# Patient Record
Sex: FEMALE | Race: WHITE | NOT HISPANIC OR LATINO | Employment: FULL TIME | URBAN - METROPOLITAN AREA
[De-identification: names, ages, dates, MRNs, and addresses within clinical notes are randomized per-mention and may not be internally consistent; named-entity substitution may affect disease eponyms.]

---

## 2021-01-25 PROBLEM — O11.9 CHRONIC HYPERTENSION WITH SUPERIMPOSED PRE-ECLAMPSIA: Status: ACTIVE | Noted: 2021-01-25

## 2021-01-27 PROBLEM — O10.913 CHRONIC HYPERTENSION WITH EXACERBATION DURING PREGNANCY IN THIRD TRIMESTER: Status: ACTIVE | Noted: 2021-01-27

## 2021-03-01 PROBLEM — E66.01 MORBID OBESITY (HCC): Status: ACTIVE | Noted: 2021-03-01

## 2024-08-01 ENCOUNTER — HOSPITAL ENCOUNTER (OUTPATIENT)
Facility: HOSPITAL | Age: 29
Setting detail: OBSERVATION
Discharge: HOME | End: 2024-08-03
Attending: STUDENT IN AN ORGANIZED HEALTH CARE EDUCATION/TRAINING PROGRAM | Admitting: INTERNAL MEDICINE
Payer: COMMERCIAL

## 2024-08-01 ENCOUNTER — APPOINTMENT (OUTPATIENT)
Dept: RADIOLOGY | Facility: HOSPITAL | Age: 29
End: 2024-08-01
Payer: COMMERCIAL

## 2024-08-01 ENCOUNTER — APPOINTMENT (OUTPATIENT)
Dept: CARDIOLOGY | Facility: HOSPITAL | Age: 29
End: 2024-08-01
Payer: COMMERCIAL

## 2024-08-01 ENCOUNTER — HOSPITAL ENCOUNTER (EMERGENCY)
Facility: HOSPITAL | Age: 29
Discharge: HOME | End: 2024-08-01
Attending: EMERGENCY MEDICINE
Payer: COMMERCIAL

## 2024-08-01 VITALS
HEART RATE: 79 BPM | SYSTOLIC BLOOD PRESSURE: 196 MMHG | HEIGHT: 66 IN | RESPIRATION RATE: 16 BRPM | BODY MASS INDEX: 47.09 KG/M2 | OXYGEN SATURATION: 100 % | TEMPERATURE: 97.5 F | DIASTOLIC BLOOD PRESSURE: 117 MMHG | WEIGHT: 293 LBS

## 2024-08-01 DIAGNOSIS — I16.0 HYPERTENSIVE URGENCY: Primary | ICD-10-CM

## 2024-08-01 DIAGNOSIS — I10 HYPERTENSION, UNSPECIFIED TYPE: Primary | ICD-10-CM

## 2024-08-01 DIAGNOSIS — I10 PRIMARY HYPERTENSION: ICD-10-CM

## 2024-08-01 PROBLEM — E87.6 HYPOKALEMIA: Status: ACTIVE | Noted: 2024-08-01

## 2024-08-01 PROBLEM — R51.9 ACUTE NONINTRACTABLE HEADACHE: Status: ACTIVE | Noted: 2024-08-01

## 2024-08-01 PROBLEM — E66.09 OBESITY DUE TO EXCESS CALORIES: Status: ACTIVE | Noted: 2024-08-01

## 2024-08-01 LAB
ALBUMIN SERPL BCP-MCNC: 4.1 G/DL (ref 3.4–5)
ALBUMIN SERPL BCP-MCNC: 4.2 G/DL (ref 3.4–5)
ALP SERPL-CCNC: 78 U/L (ref 33–110)
ALP SERPL-CCNC: 79 U/L (ref 33–110)
ALT SERPL W P-5'-P-CCNC: 22 U/L (ref 7–45)
ALT SERPL W P-5'-P-CCNC: 24 U/L (ref 7–45)
ANION GAP SERPL CALC-SCNC: 10 MMOL/L (ref 10–20)
ANION GAP SERPL CALC-SCNC: 11 MMOL/L (ref 10–20)
AST SERPL W P-5'-P-CCNC: 16 U/L (ref 9–39)
AST SERPL W P-5'-P-CCNC: 18 U/L (ref 9–39)
ATRIAL RATE: 76 BPM
BASOPHILS # BLD AUTO: 0.02 X10*3/UL (ref 0–0.1)
BASOPHILS # BLD AUTO: 0.02 X10*3/UL (ref 0–0.1)
BASOPHILS NFR BLD AUTO: 0.2 %
BASOPHILS NFR BLD AUTO: 0.2 %
BILIRUB SERPL-MCNC: 0.4 MG/DL (ref 0–1.2)
BILIRUB SERPL-MCNC: 0.5 MG/DL (ref 0–1.2)
BNP SERPL-MCNC: 140 PG/ML (ref 0–99)
BUN SERPL-MCNC: 15 MG/DL (ref 6–23)
BUN SERPL-MCNC: 18 MG/DL (ref 6–23)
CALCIUM SERPL-MCNC: 9.1 MG/DL (ref 8.6–10.3)
CALCIUM SERPL-MCNC: 9.3 MG/DL (ref 8.6–10.3)
CARDIAC TROPONIN I PNL SERPL HS: 10 NG/L (ref 0–13)
CHLORIDE SERPL-SCNC: 103 MMOL/L (ref 98–107)
CHLORIDE SERPL-SCNC: 103 MMOL/L (ref 98–107)
CO2 SERPL-SCNC: 26 MMOL/L (ref 21–32)
CO2 SERPL-SCNC: 26 MMOL/L (ref 21–32)
CREAT SERPL-MCNC: 0.67 MG/DL (ref 0.5–1.05)
CREAT SERPL-MCNC: 0.73 MG/DL (ref 0.5–1.05)
EGFRCR SERPLBLD CKD-EPI 2021: >90 ML/MIN/1.73M*2
EGFRCR SERPLBLD CKD-EPI 2021: >90 ML/MIN/1.73M*2
EOSINOPHIL # BLD AUTO: 0.17 X10*3/UL (ref 0–0.7)
EOSINOPHIL # BLD AUTO: 0.24 X10*3/UL (ref 0–0.7)
EOSINOPHIL NFR BLD AUTO: 1.6 %
EOSINOPHIL NFR BLD AUTO: 2.5 %
ERYTHROCYTE [DISTWIDTH] IN BLOOD BY AUTOMATED COUNT: 15 % (ref 11.5–14.5)
ERYTHROCYTE [DISTWIDTH] IN BLOOD BY AUTOMATED COUNT: 15.2 % (ref 11.5–14.5)
GLUCOSE SERPL-MCNC: 131 MG/DL (ref 74–99)
GLUCOSE SERPL-MCNC: 82 MG/DL (ref 74–99)
HCG UR QL IA.RAPID: NEGATIVE
HCT VFR BLD AUTO: 40.6 % (ref 36–46)
HCT VFR BLD AUTO: 41.5 % (ref 36–46)
HGB BLD-MCNC: 13.8 G/DL (ref 12–16)
HGB BLD-MCNC: 13.9 G/DL (ref 12–16)
IMM GRANULOCYTES # BLD AUTO: 0.03 X10*3/UL (ref 0–0.7)
IMM GRANULOCYTES # BLD AUTO: 0.03 X10*3/UL (ref 0–0.7)
IMM GRANULOCYTES NFR BLD AUTO: 0.3 % (ref 0–0.9)
IMM GRANULOCYTES NFR BLD AUTO: 0.3 % (ref 0–0.9)
LIPASE SERPL-CCNC: 12 U/L (ref 9–82)
LYMPHOCYTES # BLD AUTO: 2.02 X10*3/UL (ref 1.2–4.8)
LYMPHOCYTES # BLD AUTO: 2.57 X10*3/UL (ref 1.2–4.8)
LYMPHOCYTES NFR BLD AUTO: 20.7 %
LYMPHOCYTES NFR BLD AUTO: 24 %
MAGNESIUM SERPL-MCNC: 1.98 MG/DL (ref 1.6–2.4)
MCH RBC QN AUTO: 26.1 PG (ref 26–34)
MCH RBC QN AUTO: 26.4 PG (ref 26–34)
MCHC RBC AUTO-ENTMCNC: 33.5 G/DL (ref 32–36)
MCHC RBC AUTO-ENTMCNC: 34 G/DL (ref 32–36)
MCV RBC AUTO: 78 FL (ref 80–100)
MCV RBC AUTO: 78 FL (ref 80–100)
MONOCYTES # BLD AUTO: 0.46 X10*3/UL (ref 0.1–1)
MONOCYTES # BLD AUTO: 0.48 X10*3/UL (ref 0.1–1)
MONOCYTES NFR BLD AUTO: 4.3 %
MONOCYTES NFR BLD AUTO: 4.9 %
NEUTROPHILS # BLD AUTO: 6.95 X10*3/UL (ref 1.2–7.7)
NEUTROPHILS # BLD AUTO: 7.45 X10*3/UL (ref 1.2–7.7)
NEUTROPHILS NFR BLD AUTO: 69.6 %
NEUTROPHILS NFR BLD AUTO: 71.4 %
NRBC BLD-RTO: 0 /100 WBCS (ref 0–0)
NRBC BLD-RTO: 0 /100 WBCS (ref 0–0)
P AXIS: 43 DEGREES
PLATELET # BLD AUTO: 324 X10*3/UL (ref 150–450)
PLATELET # BLD AUTO: 344 X10*3/UL (ref 150–450)
POTASSIUM SERPL-SCNC: 3.3 MMOL/L (ref 3.5–5.3)
POTASSIUM SERPL-SCNC: 3.4 MMOL/L (ref 3.5–5.3)
PR INTERVAL: 168 MS
PROT SERPL-MCNC: 7.4 G/DL (ref 6.4–8.2)
PROT SERPL-MCNC: 7.7 G/DL (ref 6.4–8.2)
Q ONSET: 249 MS
QRS COUNT: 12 BEATS
QRS DURATION: 116 MS
QT INTERVAL: 432 MS
QTC CALCULATION(BAZETT): 476 MS
QTC FREDERICIA: 461 MS
R AXIS: 32 DEGREES
RBC # BLD AUTO: 5.22 X10*6/UL (ref 4–5.2)
RBC # BLD AUTO: 5.33 X10*6/UL (ref 4–5.2)
SODIUM SERPL-SCNC: 136 MMOL/L (ref 136–145)
SODIUM SERPL-SCNC: 137 MMOL/L (ref 136–145)
T AXIS: 23 DEGREES
T OFFSET: 465 MS
VENTRICULAR RATE: 73 BPM
WBC # BLD AUTO: 10.7 X10*3/UL (ref 4.4–11.3)
WBC # BLD AUTO: 9.7 X10*3/UL (ref 4.4–11.3)

## 2024-08-01 PROCEDURE — 80053 COMPREHEN METABOLIC PANEL: CPT | Performed by: STUDENT IN AN ORGANIZED HEALTH CARE EDUCATION/TRAINING PROGRAM

## 2024-08-01 PROCEDURE — 85025 COMPLETE CBC W/AUTO DIFF WBC: CPT | Performed by: STUDENT IN AN ORGANIZED HEALTH CARE EDUCATION/TRAINING PROGRAM

## 2024-08-01 PROCEDURE — 93005 ELECTROCARDIOGRAM TRACING: CPT

## 2024-08-01 PROCEDURE — 2500000002 HC RX 250 W HCPCS SELF ADMINISTERED DRUGS (ALT 637 FOR MEDICARE OP, ALT 636 FOR OP/ED): Performed by: NURSE PRACTITIONER

## 2024-08-01 PROCEDURE — 83690 ASSAY OF LIPASE: CPT | Performed by: STUDENT IN AN ORGANIZED HEALTH CARE EDUCATION/TRAINING PROGRAM

## 2024-08-01 PROCEDURE — 84484 ASSAY OF TROPONIN QUANT: CPT | Performed by: EMERGENCY MEDICINE

## 2024-08-01 PROCEDURE — 2500000004 HC RX 250 GENERAL PHARMACY W/ HCPCS (ALT 636 FOR OP/ED)

## 2024-08-01 PROCEDURE — 2500000004 HC RX 250 GENERAL PHARMACY W/ HCPCS (ALT 636 FOR OP/ED): Performed by: NURSE PRACTITIONER

## 2024-08-01 PROCEDURE — 2500000004 HC RX 250 GENERAL PHARMACY W/ HCPCS (ALT 636 FOR OP/ED): Performed by: EMERGENCY MEDICINE

## 2024-08-01 PROCEDURE — G0378 HOSPITAL OBSERVATION PER HR: HCPCS

## 2024-08-01 PROCEDURE — 36415 COLL VENOUS BLD VENIPUNCTURE: CPT | Performed by: EMERGENCY MEDICINE

## 2024-08-01 PROCEDURE — 96365 THER/PROPH/DIAG IV INF INIT: CPT | Mod: 59

## 2024-08-01 PROCEDURE — 99285 EMERGENCY DEPT VISIT HI MDM: CPT

## 2024-08-01 PROCEDURE — 99223 1ST HOSP IP/OBS HIGH 75: CPT | Performed by: NURSE PRACTITIONER

## 2024-08-01 PROCEDURE — 85025 COMPLETE CBC W/AUTO DIFF WBC: CPT | Performed by: EMERGENCY MEDICINE

## 2024-08-01 PROCEDURE — 70450 CT HEAD/BRAIN W/O DYE: CPT

## 2024-08-01 PROCEDURE — 83735 ASSAY OF MAGNESIUM: CPT | Performed by: STUDENT IN AN ORGANIZED HEALTH CARE EDUCATION/TRAINING PROGRAM

## 2024-08-01 PROCEDURE — 84075 ASSAY ALKALINE PHOSPHATASE: CPT | Performed by: EMERGENCY MEDICINE

## 2024-08-01 PROCEDURE — 2500000001 HC RX 250 WO HCPCS SELF ADMINISTERED DRUGS (ALT 637 FOR MEDICARE OP): Performed by: EMERGENCY MEDICINE

## 2024-08-01 PROCEDURE — 71045 X-RAY EXAM CHEST 1 VIEW: CPT

## 2024-08-01 PROCEDURE — 96366 THER/PROPH/DIAG IV INF ADDON: CPT

## 2024-08-01 PROCEDURE — 84484 ASSAY OF TROPONIN QUANT: CPT | Performed by: STUDENT IN AN ORGANIZED HEALTH CARE EDUCATION/TRAINING PROGRAM

## 2024-08-01 PROCEDURE — 96374 THER/PROPH/DIAG INJ IV PUSH: CPT

## 2024-08-01 PROCEDURE — 81025 URINE PREGNANCY TEST: CPT | Performed by: NURSE PRACTITIONER

## 2024-08-01 PROCEDURE — 71045 X-RAY EXAM CHEST 1 VIEW: CPT | Performed by: RADIOLOGY

## 2024-08-01 PROCEDURE — 83880 ASSAY OF NATRIURETIC PEPTIDE: CPT | Performed by: STUDENT IN AN ORGANIZED HEALTH CARE EDUCATION/TRAINING PROGRAM

## 2024-08-01 PROCEDURE — 70450 CT HEAD/BRAIN W/O DYE: CPT | Performed by: STUDENT IN AN ORGANIZED HEALTH CARE EDUCATION/TRAINING PROGRAM

## 2024-08-01 PROCEDURE — 99285 EMERGENCY DEPT VISIT HI MDM: CPT | Mod: 25

## 2024-08-01 PROCEDURE — 96372 THER/PROPH/DIAG INJ SC/IM: CPT | Performed by: NURSE PRACTITIONER

## 2024-08-01 RX ORDER — LABETALOL HYDROCHLORIDE 5 MG/ML
INJECTION, SOLUTION INTRAVENOUS
Status: COMPLETED
Start: 2024-08-01 | End: 2024-08-01

## 2024-08-01 RX ORDER — ONDANSETRON HYDROCHLORIDE 2 MG/ML
4 INJECTION, SOLUTION INTRAVENOUS EVERY 8 HOURS PRN
Status: DISCONTINUED | OUTPATIENT
Start: 2024-08-01 | End: 2024-08-03 | Stop reason: HOSPADM

## 2024-08-01 RX ORDER — HYDRALAZINE HYDROCHLORIDE 20 MG/ML
10 INJECTION INTRAMUSCULAR; INTRAVENOUS EVERY 6 HOURS PRN
Status: DISCONTINUED | OUTPATIENT
Start: 2024-08-01 | End: 2024-08-03 | Stop reason: HOSPADM

## 2024-08-01 RX ORDER — ACETAMINOPHEN 160 MG/5ML
650 SUSPENSION ORAL EVERY 4 HOURS PRN
Status: DISCONTINUED | OUTPATIENT
Start: 2024-08-01 | End: 2024-08-03 | Stop reason: HOSPADM

## 2024-08-01 RX ORDER — TALC
6 POWDER (GRAM) TOPICAL NIGHTLY PRN
Status: DISCONTINUED | OUTPATIENT
Start: 2024-08-01 | End: 2024-08-03 | Stop reason: HOSPADM

## 2024-08-01 RX ORDER — LABETALOL 100 MG/1
100 TABLET, FILM COATED ORAL 2 TIMES DAILY
Status: DISCONTINUED | OUTPATIENT
Start: 2024-08-01 | End: 2024-08-01

## 2024-08-01 RX ORDER — ACETAMINOPHEN 325 MG/1
650 TABLET ORAL EVERY 4 HOURS PRN
Status: DISCONTINUED | OUTPATIENT
Start: 2024-08-01 | End: 2024-08-03 | Stop reason: HOSPADM

## 2024-08-01 RX ORDER — ACETAMINOPHEN 325 MG/1
975 TABLET ORAL ONCE
Status: COMPLETED | OUTPATIENT
Start: 2024-08-01 | End: 2024-08-01

## 2024-08-01 RX ORDER — LABETALOL 100 MG/1
100 TABLET, FILM COATED ORAL 2 TIMES DAILY
Qty: 60 TABLET | Refills: 0 | Status: SHIPPED | OUTPATIENT
Start: 2024-08-01 | End: 2024-08-03 | Stop reason: HOSPADM

## 2024-08-01 RX ORDER — LABETALOL 100 MG/1
100 TABLET, FILM COATED ORAL EVERY 8 HOURS
Status: DISCONTINUED | OUTPATIENT
Start: 2024-08-02 | End: 2024-08-02

## 2024-08-01 RX ORDER — LABETALOL HYDROCHLORIDE 5 MG/ML
20 INJECTION, SOLUTION INTRAVENOUS ONCE
Status: COMPLETED | OUTPATIENT
Start: 2024-08-01 | End: 2024-08-01

## 2024-08-01 RX ORDER — ACETAMINOPHEN 650 MG/1
650 SUPPOSITORY RECTAL EVERY 4 HOURS PRN
Status: DISCONTINUED | OUTPATIENT
Start: 2024-08-01 | End: 2024-08-03 | Stop reason: HOSPADM

## 2024-08-01 RX ORDER — ENOXAPARIN SODIUM 100 MG/ML
60 INJECTION SUBCUTANEOUS EVERY 12 HOURS SCHEDULED
Status: DISCONTINUED | OUTPATIENT
Start: 2024-08-01 | End: 2024-08-03 | Stop reason: HOSPADM

## 2024-08-01 RX ORDER — NITROGLYCERIN 0.4 MG/1
0.4 TABLET SUBLINGUAL EVERY 5 MIN PRN
Status: DISCONTINUED | OUTPATIENT
Start: 2024-08-01 | End: 2024-08-01 | Stop reason: HOSPADM

## 2024-08-01 RX ORDER — NIFEDIPINE 30 MG/1
30 TABLET, FILM COATED, EXTENDED RELEASE ORAL 2 TIMES DAILY
Status: DISCONTINUED | OUTPATIENT
Start: 2024-08-01 | End: 2024-08-02

## 2024-08-01 RX ORDER — POTASSIUM CHLORIDE 14.9 MG/ML
20 INJECTION INTRAVENOUS ONCE
Status: COMPLETED | OUTPATIENT
Start: 2024-08-01 | End: 2024-08-02

## 2024-08-01 RX ORDER — POLYETHYLENE GLYCOL 3350 17 G/17G
17 POWDER, FOR SOLUTION ORAL DAILY
Status: DISCONTINUED | OUTPATIENT
Start: 2024-08-02 | End: 2024-08-03 | Stop reason: HOSPADM

## 2024-08-01 RX ORDER — ONDANSETRON 4 MG/1
4 TABLET, ORALLY DISINTEGRATING ORAL EVERY 8 HOURS PRN
Status: DISCONTINUED | OUTPATIENT
Start: 2024-08-01 | End: 2024-08-03 | Stop reason: HOSPADM

## 2024-08-01 SDOH — SOCIAL STABILITY: SOCIAL INSECURITY: ARE THERE ANY APPARENT SIGNS OF INJURIES/BEHAVIORS THAT COULD BE RELATED TO ABUSE/NEGLECT?: NO

## 2024-08-01 SDOH — SOCIAL STABILITY: SOCIAL INSECURITY: DO YOU FEEL ANYONE HAS EXPLOITED OR TAKEN ADVANTAGE OF YOU FINANCIALLY OR OF YOUR PERSONAL PROPERTY?: NO

## 2024-08-01 SDOH — SOCIAL STABILITY: SOCIAL INSECURITY: WERE YOU ABLE TO COMPLETE ALL THE BEHAVIORAL HEALTH SCREENINGS?: YES

## 2024-08-01 SDOH — SOCIAL STABILITY: SOCIAL INSECURITY: HAVE YOU HAD THOUGHTS OF HARMING ANYONE ELSE?: NO

## 2024-08-01 SDOH — SOCIAL STABILITY: SOCIAL INSECURITY: ABUSE: ADULT

## 2024-08-01 SDOH — SOCIAL STABILITY: SOCIAL INSECURITY: HAVE YOU HAD ANY THOUGHTS OF HARMING ANYONE ELSE?: NO

## 2024-08-01 SDOH — SOCIAL STABILITY: SOCIAL INSECURITY: HAS ANYONE EVER THREATENED TO HURT YOUR FAMILY OR YOUR PETS?: NO

## 2024-08-01 SDOH — SOCIAL STABILITY: SOCIAL INSECURITY: ARE YOU OR HAVE YOU BEEN THREATENED OR ABUSED PHYSICALLY, EMOTIONALLY, OR SEXUALLY BY ANYONE?: NO

## 2024-08-01 SDOH — SOCIAL STABILITY: SOCIAL INSECURITY: DOES ANYONE TRY TO KEEP YOU FROM HAVING/CONTACTING OTHER FRIENDS OR DOING THINGS OUTSIDE YOUR HOME?: NO

## 2024-08-01 SDOH — SOCIAL STABILITY: SOCIAL INSECURITY: DO YOU FEEL UNSAFE GOING BACK TO THE PLACE WHERE YOU ARE LIVING?: NO

## 2024-08-01 ASSESSMENT — LIFESTYLE VARIABLES
EVER FELT BAD OR GUILTY ABOUT YOUR DRINKING: NO
AUDIT-C TOTAL SCORE: 0
AUDIT-C TOTAL SCORE: 0
HOW OFTEN DO YOU HAVE A DRINK CONTAINING ALCOHOL: NEVER
SUBSTANCE_ABUSE_PAST_12_MONTHS: NO
TOTAL SCORE: 0
EVER FELT BAD OR GUILTY ABOUT YOUR DRINKING: NO
AUDIT-C TOTAL SCORE: 1
PRESCIPTION_ABUSE_PAST_12_MONTHS: NO
AUDIT-C TOTAL SCORE: 1
HAVE YOU EVER FELT YOU SHOULD CUT DOWN ON YOUR DRINKING: NO
HOW MANY STANDARD DRINKS CONTAINING ALCOHOL DO YOU HAVE ON A TYPICAL DAY: PATIENT DOES NOT DRINK
HOW OFTEN DO YOU HAVE A DRINK CONTAINING ALCOHOL: MONTHLY OR LESS
HOW MANY STANDARD DRINKS CONTAINING ALCOHOL DO YOU HAVE ON A TYPICAL DAY: 1 OR 2
TOTAL SCORE: 0
HOW OFTEN DO YOU HAVE 6 OR MORE DRINKS ON ONE OCCASION: NEVER
HOW OFTEN DO YOU HAVE 6 OR MORE DRINKS ON ONE OCCASION: NEVER
EVER HAD A DRINK FIRST THING IN THE MORNING TO STEADY YOUR NERVES TO GET RID OF A HANGOVER: NO
SKIP TO QUESTIONS 9-10: 1
HAVE PEOPLE ANNOYED YOU BY CRITICIZING YOUR DRINKING: NO
SKIP TO QUESTIONS 9-10: 1
EVER HAD A DRINK FIRST THING IN THE MORNING TO STEADY YOUR NERVES TO GET RID OF A HANGOVER: NO
HAVE YOU EVER FELT YOU SHOULD CUT DOWN ON YOUR DRINKING: NO
HAVE PEOPLE ANNOYED YOU BY CRITICIZING YOUR DRINKING: NO

## 2024-08-01 ASSESSMENT — COLUMBIA-SUICIDE SEVERITY RATING SCALE - C-SSRS

## 2024-08-01 ASSESSMENT — PAIN SCALES - GENERAL
PAINLEVEL_OUTOF10: 6
PAINLEVEL_OUTOF10: 5 - MODERATE PAIN
PAINLEVEL_OUTOF10: 0 - NO PAIN

## 2024-08-01 ASSESSMENT — ENCOUNTER SYMPTOMS
SLEEP DISTURBANCE: 0
VOMITING: 0
WHEEZING: 0
ABDOMINAL PAIN: 0
FATIGUE: 1
DIFFICULTY URINATING: 0
FLANK PAIN: 0
NUMBNESS: 0
ABDOMINAL DISTENTION: 0
CHEST TIGHTNESS: 0
EYE DISCHARGE: 0
WOUND: 0
HALLUCINATIONS: 0
DYSURIA: 0
DIARRHEA: 0
SEIZURES: 0
COUGH: 0
NECK PAIN: 0
TROUBLE SWALLOWING: 0
EYE PAIN: 0
FREQUENCY: 0
ADENOPATHY: 0
DIZZINESS: 0
PHOTOPHOBIA: 0
TREMORS: 0
ARTHRALGIAS: 0
POLYDIPSIA: 0
CHILLS: 0
COLOR CHANGE: 0
NECK STIFFNESS: 0
CHOKING: 0
SORE THROAT: 0
APPETITE CHANGE: 0
VOICE CHANGE: 0
EYE ITCHING: 0
HEADACHES: 1
DYSPHORIC MOOD: 0
SHORTNESS OF BREATH: 0
BACK PAIN: 0
UNEXPECTED WEIGHT CHANGE: 0
BRUISES/BLEEDS EASILY: 0
SINUS PAIN: 0
POLYPHAGIA: 0
PALPITATIONS: 0
NERVOUS/ANXIOUS: 0
APNEA: 0
HEMATURIA: 0
CONFUSION: 0
MYALGIAS: 0
SPEECH DIFFICULTY: 0
NAUSEA: 1
LIGHT-HEADEDNESS: 0
BLOOD IN STOOL: 0
FEVER: 0
CONSTIPATION: 0
WEAKNESS: 0

## 2024-08-01 ASSESSMENT — ACTIVITIES OF DAILY LIVING (ADL)
PATIENT'S MEMORY ADEQUATE TO SAFELY COMPLETE DAILY ACTIVITIES?: YES
FEEDING YOURSELF: INDEPENDENT
TOILETING: INDEPENDENT
GROOMING: INDEPENDENT
WALKS IN HOME: INDEPENDENT
JUDGMENT_ADEQUATE_SAFELY_COMPLETE_DAILY_ACTIVITIES: YES
ADEQUATE_TO_COMPLETE_ADL: YES
HEARING - LEFT EAR: FUNCTIONAL
HEARING - RIGHT EAR: FUNCTIONAL
DRESSING YOURSELF: INDEPENDENT
BATHING: INDEPENDENT
LACK_OF_TRANSPORTATION: NO

## 2024-08-01 ASSESSMENT — COGNITIVE AND FUNCTIONAL STATUS - GENERAL
MOBILITY SCORE: 24
DAILY ACTIVITIY SCORE: 24
PATIENT BASELINE BEDBOUND: NO

## 2024-08-01 ASSESSMENT — PAIN DESCRIPTION - LOCATION
LOCATION: CHEST
LOCATION: HEAD

## 2024-08-01 ASSESSMENT — PATIENT HEALTH QUESTIONNAIRE - PHQ9
2. FEELING DOWN, DEPRESSED OR HOPELESS: NOT AT ALL
SUM OF ALL RESPONSES TO PHQ9 QUESTIONS 1 & 2: 0
SUM OF ALL RESPONSES TO PHQ9 QUESTIONS 1 & 2: 0
1. LITTLE INTEREST OR PLEASURE IN DOING THINGS: NOT AT ALL
1. LITTLE INTEREST OR PLEASURE IN DOING THINGS: NOT AT ALL
2. FEELING DOWN, DEPRESSED OR HOPELESS: NOT AT ALL

## 2024-08-01 ASSESSMENT — PAIN - FUNCTIONAL ASSESSMENT
PAIN_FUNCTIONAL_ASSESSMENT: 0-10
PAIN_FUNCTIONAL_ASSESSMENT: 0-10

## 2024-08-01 ASSESSMENT — PAIN DESCRIPTION - PAIN TYPE: TYPE: ACUTE PAIN

## 2024-08-01 NOTE — ED TRIAGE NOTES
Pt here with c/o blurred vision and right arm numbness. Hx of htn. Not on any meds. Bp high in triage. No cp

## 2024-08-01 NOTE — ED PROVIDER NOTES
HPI   Chief Complaint   Patient presents with   • Hypertension       Patient presents today with blurry vision and lightheadedness.  She states that she was at work this morning and thought she was going to have a stroke.  She states she felt very lightheaded and her vision was blurred in both eyes.  Then about 45 minutes ago she had some chest tightness.  It does not radiate.  She denies any shortness of breath with this.  No nausea or vomiting.  She does have a history of hypertension.  She states that she has been on labetalol in the past as well as nifedipine during her pregnancy but is currently on no medications for blood pressure.  No slurred speech or facial droop.  She denies any weakness in her arms or legs.                          No data recorded                Patient History   No past medical history on file.  No past surgical history on file.  No family history on file.  Social History     Tobacco Use   • Smoking status: Not on file   • Smokeless tobacco: Not on file   Substance Use Topics   • Alcohol use: Not on file   • Drug use: Not on file       Physical Exam   ED Triage Vitals [08/01/24 0827]   Temperature Heart Rate Respirations BP   36.4 °C (97.5 °F) (!) 104 19 --      Pulse Ox Temp Source Heart Rate Source Patient Position   100 % Temporal -- Sitting      BP Location FiO2 (%)     Left arm --       Physical Exam  Vitals and nursing note reviewed.   Constitutional:       Appearance: Normal appearance.   HENT:      Head: Normocephalic and atraumatic.      Mouth/Throat:      Mouth: Mucous membranes are moist.   Eyes:      Extraocular Movements: Extraocular movements intact.      Pupils: Pupils are equal, round, and reactive to light.   Cardiovascular:      Rate and Rhythm: Normal rate and regular rhythm.      Heart sounds: No murmur heard.  Pulmonary:      Effort: Pulmonary effort is normal. No respiratory distress.      Breath sounds: Normal breath sounds.   Abdominal:      General: There is no  distension.      Palpations: Abdomen is soft.      Tenderness: There is no abdominal tenderness.   Musculoskeletal:         General: No tenderness or deformity. Normal range of motion.      Cervical back: Neck supple.      Right lower leg: No edema.      Left lower leg: No edema.   Skin:     General: Skin is warm and dry.      Findings: No lesion or rash.   Neurological:      General: No focal deficit present.      Mental Status: She is alert and oriented to person, place, and time.      Sensory: No sensory deficit.      Motor: No weakness.      Comments: NIH equals 0   Psychiatric:         Behavior: Behavior normal.       Labs Reviewed   TROPONIN SERIES- (INITIAL, 1 HR)    Narrative:     The following orders were created for panel order Troponin I Series, High Sensitivity (0, 1 HR).  Procedure                               Abnormality         Status                     ---------                               -----------         ------                     Troponin I, High Sensiti...[[id number]]                                                   Please view results for these tests on the individual orders.   CBC WITH AUTO DIFFERENTIAL   COMPREHENSIVE METABOLIC PANEL   SERIAL TROPONIN-INITIAL     XR chest 1 view    (Results Pending)     ED Course & MDM   Diagnoses as of 08/01/24 1144   Hypertension, unspecified type       Medical Decision Making  Differentials include potential emergency/urgency, ACS, musculoskeletal pain, electrolyte abnormality.  Imaging studies, if performed, were independently reviewed and interpreted by myself and confirmed by radiologist. EKG(s), if performed, were interpreted by myself.The patient had an EKG that was sinus rhythm with a rate of 92.  No acute ST changes.  QTc 457, .  Repeat EKG was sinus rhythm at 73, again no acute ischemia.  QTc 476, .    Laboratory studies unremarkable except for potassium 3.4.  Troponin is negative.  Chest x-ray unremarkable.  Her initial blood  pressure was elevated at 238/149.  She was given 3 nitroglycerin without any relief.  I then gave her 20 mg of IV labetalol.  Repeat blood pressure is now down to 197/124.  Upon reevaluation at 11:40 AM she is feeling much better.  She wants to be treated as an outpatient does not want to stay in the hospital.  She has been on labetalol previously at home.  I stressed that I am going to restart this and she needs to be compliant with this to help with her blood pressure.  Patient will be discharged to follow-up with her PCP.        Procedure  Procedures     oRbby Hoang MD  08/01/24 1145

## 2024-08-02 ENCOUNTER — APPOINTMENT (OUTPATIENT)
Dept: RADIOLOGY | Facility: HOSPITAL | Age: 29
End: 2024-08-02
Payer: COMMERCIAL

## 2024-08-02 LAB
ANION GAP SERPL CALC-SCNC: 11 MMOL/L (ref 10–20)
ATRIAL RATE: 74 BPM
BUN SERPL-MCNC: 11 MG/DL (ref 6–23)
CALCIUM SERPL-MCNC: 8.5 MG/DL (ref 8.6–10.3)
CHLORIDE SERPL-SCNC: 108 MMOL/L (ref 98–107)
CO2 SERPL-SCNC: 23 MMOL/L (ref 21–32)
CREAT SERPL-MCNC: 0.53 MG/DL (ref 0.5–1.05)
EGFRCR SERPLBLD CKD-EPI 2021: >90 ML/MIN/1.73M*2
GLUCOSE SERPL-MCNC: 84 MG/DL (ref 74–99)
P AXIS: 17 DEGREES
POTASSIUM SERPL-SCNC: 3.8 MMOL/L (ref 3.5–5.3)
PR INTERVAL: 176 MS
Q ONSET: 249 MS
QRS COUNT: 12 BEATS
QRS DURATION: 109 MS
QT INTERVAL: 438 MS
QTC CALCULATION(BAZETT): 480 MS
QTC FREDERICIA: 465 MS
R AXIS: 28 DEGREES
SODIUM SERPL-SCNC: 138 MMOL/L (ref 136–145)
T AXIS: 19 DEGREES
T OFFSET: 468 MS
VENTRICULAR RATE: 72 BPM

## 2024-08-02 PROCEDURE — 36415 COLL VENOUS BLD VENIPUNCTURE: CPT | Performed by: NURSE PRACTITIONER

## 2024-08-02 PROCEDURE — 96374 THER/PROPH/DIAG INJ IV PUSH: CPT | Mod: 59

## 2024-08-02 PROCEDURE — 70450 CT HEAD/BRAIN W/O DYE: CPT | Performed by: RADIOLOGY

## 2024-08-02 PROCEDURE — 99233 SBSQ HOSP IP/OBS HIGH 50: CPT | Performed by: INTERNAL MEDICINE

## 2024-08-02 PROCEDURE — G0378 HOSPITAL OBSERVATION PER HR: HCPCS

## 2024-08-02 PROCEDURE — 2500000001 HC RX 250 WO HCPCS SELF ADMINISTERED DRUGS (ALT 637 FOR MEDICARE OP): Performed by: INTERNAL MEDICINE

## 2024-08-02 PROCEDURE — 2500000004 HC RX 250 GENERAL PHARMACY W/ HCPCS (ALT 636 FOR OP/ED): Performed by: NURSE PRACTITIONER

## 2024-08-02 PROCEDURE — 70450 CT HEAD/BRAIN W/O DYE: CPT

## 2024-08-02 PROCEDURE — 96375 TX/PRO/DX INJ NEW DRUG ADDON: CPT

## 2024-08-02 PROCEDURE — 82374 ASSAY BLOOD CARBON DIOXIDE: CPT | Performed by: NURSE PRACTITIONER

## 2024-08-02 PROCEDURE — 96372 THER/PROPH/DIAG INJ SC/IM: CPT | Performed by: NURSE PRACTITIONER

## 2024-08-02 RX ORDER — LISINOPRIL 20 MG/1
20 TABLET ORAL DAILY
Status: DISCONTINUED | OUTPATIENT
Start: 2024-08-02 | End: 2024-08-03 | Stop reason: HOSPADM

## 2024-08-02 RX ORDER — HYDROCHLOROTHIAZIDE 25 MG/1
25 TABLET ORAL DAILY
Status: DISCONTINUED | OUTPATIENT
Start: 2024-08-02 | End: 2024-08-03 | Stop reason: HOSPADM

## 2024-08-02 RX ORDER — AMLODIPINE BESYLATE 10 MG/1
10 TABLET ORAL DAILY
Status: DISCONTINUED | OUTPATIENT
Start: 2024-08-02 | End: 2024-08-03 | Stop reason: HOSPADM

## 2024-08-02 RX ORDER — BUTALBITAL, ACETAMINOPHEN AND CAFFEINE 50; 325; 40 MG/1; MG/1; MG/1
1 TABLET ORAL EVERY 4 HOURS PRN
Status: DISCONTINUED | OUTPATIENT
Start: 2024-08-02 | End: 2024-08-03 | Stop reason: HOSPADM

## 2024-08-02 RX ORDER — CARVEDILOL 12.5 MG/1
12.5 TABLET ORAL 2 TIMES DAILY
Status: DISCONTINUED | OUTPATIENT
Start: 2024-08-02 | End: 2024-08-03 | Stop reason: HOSPADM

## 2024-08-02 RX ORDER — LABETALOL 100 MG/1
100 TABLET, FILM COATED ORAL ONCE
Status: COMPLETED | OUTPATIENT
Start: 2024-08-02 | End: 2024-08-02

## 2024-08-02 ASSESSMENT — COGNITIVE AND FUNCTIONAL STATUS - GENERAL
DAILY ACTIVITIY SCORE: 24
DAILY ACTIVITIY SCORE: 24
MOBILITY SCORE: 24
MOBILITY SCORE: 24

## 2024-08-02 ASSESSMENT — PAIN - FUNCTIONAL ASSESSMENT
PAIN_FUNCTIONAL_ASSESSMENT: 0-10
PAIN_FUNCTIONAL_ASSESSMENT: 0-10

## 2024-08-02 ASSESSMENT — ACTIVITIES OF DAILY LIVING (ADL): LACK_OF_TRANSPORTATION: NO

## 2024-08-02 ASSESSMENT — VISUAL ACUITY: OU: 1

## 2024-08-02 ASSESSMENT — PAIN SCALES - GENERAL
PAINLEVEL_OUTOF10: 3
PAINLEVEL_OUTOF10: 0 - NO PAIN
PAINLEVEL_OUTOF10: 8
PAINLEVEL_OUTOF10: 3

## 2024-08-02 ASSESSMENT — PAIN DESCRIPTION - LOCATION
LOCATION: HEAD
LOCATION: HEAD

## 2024-08-02 NOTE — PROGRESS NOTES
Bernadette Meeks is a 29 y.o. female on day 0 of admission presenting with Hypertensive urgency.      Subjective   History Of Present Illness:  Bernadette Meeks is a 29 y.o. female with PMHx s/f HTN, obesity  presenting with elevated blood pressure.  The patient has a previous history of hypertension several years back first diagnosed during the time of her pregnancy.  At that time she was on labetalol 200 mg 3 times a day and Procardia 120 mg daily patient subsequently had her child and really did not follow-up on her hypertension.  She had presented to emergency department earlier today was given IV labetalol and offered admission patient declined went home she had prescription for labetalol but said on the prescription because she could not start till tomorrow she did not take any began to have some headache nausea and came back to the hospital.  Patient currently denies any chest pain any shortness of breath palpitations she admits to headache nausea dizziness denies abdominal pain back pain urinary symptoms.     ED Course (Summary):   Vitals on presentation: Initial blood pressure 212/147 post labetalol 189/109 heart rate 91 respiratory rate 16 temperature 98.1 SpO2 99% on room air  Labs: Chemistry panel sodium 136 potassium 3.3 chloride 103 CO2 26 BUN 15 creatinine 0.67 glucose 131 liver enzymes are within normal limits magnesium 1.98 BN peptide 140 CBC shows WBC 10.7 hemoglobin 13.9 hematocrit 41.5 platelets 344 imaging: Repeated troponins are 10  Interventions: Patient given 10 mg of IV labetalol referred for admission  4.   Imaging EKG shows sinus rhythm and LVH CT of head negative for acute intracranial abnormality chest x-ray shows lungs to be clear no acute cardiopulmonary process     ED Course:      ED Course as of 08/01/24 2245   Thu Aug 01, 2024   2205 IMPRESSION:  No acute intracranial abnormality.       [CF]       ED Course User Index  [CF] Shelby Zaldivar MD           Diagnoses as of 08/01/24 2245  "  Hypertensive urgency          08/02/2024: Patient was evaluated this morning. She is not in acute distress. She complains of a headache that is \"all over\" and some vision changes associated with the headache.       Objective     Last Recorded Vitals  BP (!) 181/117   Pulse 73   Temp 36.6 °C (97.8 °F) (Temporal)   Resp 19   Wt 147 kg (323 lb 6.6 oz)   SpO2 97%   Intake/Output last 3 Shifts:    Intake/Output Summary (Last 24 hours) at 8/2/2024 1311  Last data filed at 8/2/2024 0151  Gross per 24 hour   Intake 100 ml   Output --   Net 100 ml       Admission Weight  Weight: 144 kg (318 lb) (08/01/24 1953)    Daily Weight  08/01/24 : 147 kg (323 lb 6.6 oz)    Image Results  ECG 12 lead  Sinus rhythm  Atrial premature complex  Probable left atrial enlargement  Left ventricular hypertrophy  ST elevation, consider anterior injury  Borderline prolonged QT interval      Physical Exam  Vitals reviewed.   Constitutional:       General: She is awake.      Appearance: Normal appearance. She is well-groomed. She is obese.   HENT:      Head: Normocephalic and atraumatic.   Eyes:      General: Vision grossly intact. Gaze aligned appropriately.      Extraocular Movements: Extraocular movements intact.      Conjunctiva/sclera: Conjunctivae normal.   Cardiovascular:      Rate and Rhythm: Normal rate and regular rhythm.      Pulses: Normal pulses.      Heart sounds: Normal heart sounds.   Pulmonary:      Effort: Pulmonary effort is normal.      Breath sounds: Normal breath sounds.   Abdominal:      General: Bowel sounds are normal.      Palpations: Abdomen is soft.   Neurological:      General: No focal deficit present.      Mental Status: She is alert and oriented to person, place, and time.   Psychiatric:         Attention and Perception: Attention and perception normal.         Mood and Affect: Mood and affect normal.         Speech: Speech normal.         Behavior: Behavior normal. Behavior is cooperative.         Thought " Content: Thought content normal.         Cognition and Memory: Cognition and memory normal.         Judgment: Judgment normal.     Scheduled medications  amLODIPine, 10 mg, oral, Daily  carvedilol, 12.5 mg, oral, BID  enoxaparin, 60 mg, subcutaneous, q12h PJ  hydroCHLOROthiazide, 25 mg, oral, Daily  lisinopril, 20 mg, oral, Daily  polyethylene glycol, 17 g, oral, Daily      Continuous medications     PRN medications  PRN medications: acetaminophen **OR** acetaminophen **OR** acetaminophen, hydrALAZINE, melatonin, ondansetron ODT **OR** ondansetron     Results for orders placed or performed during the hospital encounter of 08/01/24 (from the past 24 hour(s))   CBC and Auto Differential   Result Value Ref Range    WBC 10.7 4.4 - 11.3 x10*3/uL    nRBC 0.0 0.0 - 0.0 /100 WBCs    RBC 5.33 (H) 4.00 - 5.20 x10*6/uL    Hemoglobin 13.9 12.0 - 16.0 g/dL    Hematocrit 41.5 36.0 - 46.0 %    MCV 78 (L) 80 - 100 fL    MCH 26.1 26.0 - 34.0 pg    MCHC 33.5 32.0 - 36.0 g/dL    RDW 15.2 (H) 11.5 - 14.5 %    Platelets 344 150 - 450 x10*3/uL    Neutrophils % 69.6 40.0 - 80.0 %    Immature Granulocytes %, Automated 0.3 0.0 - 0.9 %    Lymphocytes % 24.0 13.0 - 44.0 %    Monocytes % 4.3 2.0 - 10.0 %    Eosinophils % 1.6 0.0 - 6.0 %    Basophils % 0.2 0.0 - 2.0 %    Neutrophils Absolute 7.45 1.20 - 7.70 x10*3/uL    Immature Granulocytes Absolute, Automated 0.03 0.00 - 0.70 x10*3/uL    Lymphocytes Absolute 2.57 1.20 - 4.80 x10*3/uL    Monocytes Absolute 0.46 0.10 - 1.00 x10*3/uL    Eosinophils Absolute 0.17 0.00 - 0.70 x10*3/uL    Basophils Absolute 0.02 0.00 - 0.10 x10*3/uL   Magnesium   Result Value Ref Range    Magnesium 1.98 1.60 - 2.40 mg/dL   Comprehensive metabolic panel   Result Value Ref Range    Glucose 131 (H) 74 - 99 mg/dL    Sodium 136 136 - 145 mmol/L    Potassium 3.3 (L) 3.5 - 5.3 mmol/L    Chloride 103 98 - 107 mmol/L    Bicarbonate 26 21 - 32 mmol/L    Anion Gap 10 10 - 20 mmol/L    Urea Nitrogen 15 6 - 23 mg/dL     Creatinine 0.67 0.50 - 1.05 mg/dL    eGFR >90 >60 mL/min/1.73m*2    Calcium 9.3 8.6 - 10.3 mg/dL    Albumin 4.2 3.4 - 5.0 g/dL    Alkaline Phosphatase 79 33 - 110 U/L    Total Protein 7.7 6.4 - 8.2 g/dL    AST 18 9 - 39 U/L    Bilirubin, Total 0.5 0.0 - 1.2 mg/dL    ALT 24 7 - 45 U/L   Lipase   Result Value Ref Range    Lipase 12 9 - 82 U/L   Troponin I, High Sensitivity   Result Value Ref Range    Troponin I, High Sensitivity 10 0 - 13 ng/L   B-Type Natriuretic Peptide   Result Value Ref Range     (H) 0 - 99 pg/mL   hCG, Urine, Qualitative   Result Value Ref Range    HCG, Urine NEGATIVE NEGATIVE   Basic metabolic panel   Result Value Ref Range    Glucose 84 74 - 99 mg/dL    Sodium 138 136 - 145 mmol/L    Potassium 3.8 3.5 - 5.3 mmol/L    Chloride 108 (H) 98 - 107 mmol/L    Bicarbonate 23 21 - 32 mmol/L    Anion Gap 11 10 - 20 mmol/L    Urea Nitrogen 11 6 - 23 mg/dL    Creatinine 0.53 0.50 - 1.05 mg/dL    eGFR >90 >60 mL/min/1.73m*2    Calcium 8.5 (L) 8.6 - 10.3 mg/dL          Relevant Results               Assessment/Plan   This patient currently has cardiac telemetry ordered; if you would like to modify or discontinue the telemetry order, click here to go to the orders activity to modify/discontinue the order.    Principal Problem:    Hypertensive urgency  Active Problems:    Primary hypertension    Hypokalemia    Obesity due to excess calories    Acute nonintractable headache    Hypertensive urgency  -1st diagnosed with HTN a few years back, was on labetolol 200 tid, procardia xl 120 daily  -Gradually lost to follow up  -Complains of headache/vision changes   -new regiment started today  -Coreg 12.5mg PO  -Lisinopril 20mg PO  -Norvasc 10mg PO  -Hydrochlorothiazide 25mg PO    Obesity BMI 51.33  -Pt has followed ketogenic diet and lost weight  -Pt encouraged to continue     Hypokalemia  -replete PRN            ELIER ECKHAUSER    I have independently examined and interviewed patient and have reviewed  patients chart. I have addended the assessment and plan with my recommendations.    Juvenal Cerda MD PhD

## 2024-08-02 NOTE — ED PROVIDER NOTES
HPI   Chief Complaint   Patient presents with    Hypertension     Dizziness, HA       This is a 29-year-old female who presents emergency department for high blood pressure.  She was seen here earlier and at that time she was offered admission states she did require IV labetalol and was symptomatic.  Her labs at that time were negative.  She was given labetalol for discharge 100 mg.  Here patient reports that she began having chest pain and she woke up from a nap along with a headache and dizziness.  No other issues or concerns                          Mak Coma Scale Score: 15                  Patient History   Past Medical History:   Diagnosis Date    Hypertension      No past surgical history on file.  No family history on file.  Social History     Tobacco Use    Smoking status: Never    Smokeless tobacco: Never   Vaping Use    Vaping status: Every Day   Substance Use Topics    Alcohol use: Not on file    Drug use: Not on file       Physical Exam   ED Triage Vitals   Temperature Heart Rate Respirations BP   08/01/24 1953 08/01/24 1953 08/01/24 1953 08/01/24 1953   36.7 °C (98.1 °F) 91 16 (!) 212/147      Pulse Ox Temp Source Heart Rate Source Patient Position   08/01/24 1953 08/01/24 1953 08/01/24 2000 --   99 % Oral Monitor       BP Location FiO2 (%)     08/01/24 2000 08/01/24 1953     Left arm 21 %       Physical Exam  Constitutional:       General: She is not in acute distress.  HENT:      Head: Normocephalic and atraumatic.      Right Ear: Tympanic membrane normal.      Left Ear: Tympanic membrane normal.      Mouth/Throat:      Mouth: Mucous membranes are moist.   Eyes:      Extraocular Movements: Extraocular movements intact.      Conjunctiva/sclera: Conjunctivae normal.      Pupils: Pupils are equal, round, and reactive to light.   Cardiovascular:      Rate and Rhythm: Normal rate and regular rhythm.      Heart sounds: No murmur heard.  Pulmonary:      Effort: Pulmonary effort is normal. No respiratory  distress.      Breath sounds: Normal breath sounds. No stridor. No wheezing or rales.   Abdominal:      General: Bowel sounds are normal. There is no distension.      Tenderness: There is no abdominal tenderness. There is no guarding or rebound.   Musculoskeletal:         General: No swelling, tenderness or deformity. Normal range of motion.   Skin:     General: Skin is warm and dry.      Coloration: Skin is not jaundiced.      Findings: No bruising or lesion.   Neurological:      General: No focal deficit present.      Mental Status: She is alert and oriented to person, place, and time. Mental status is at baseline.      Cranial Nerves: No cranial nerve deficit.      Motor: No weakness.   Psychiatric:         Mood and Affect: Mood normal.       Labs Reviewed   CBC WITH AUTO DIFFERENTIAL - Abnormal       Result Value    WBC 10.7      nRBC 0.0      RBC 5.33 (*)     Hemoglobin 13.9      Hematocrit 41.5      MCV 78 (*)     MCH 26.1      MCHC 33.5      RDW 15.2 (*)     Platelets 344      Neutrophils % 69.6      Immature Granulocytes %, Automated 0.3      Lymphocytes % 24.0      Monocytes % 4.3      Eosinophils % 1.6      Basophils % 0.2      Neutrophils Absolute 7.45      Immature Granulocytes Absolute, Automated 0.03      Lymphocytes Absolute 2.57      Monocytes Absolute 0.46      Eosinophils Absolute 0.17      Basophils Absolute 0.02     COMPREHENSIVE METABOLIC PANEL - Abnormal    Glucose 131 (*)     Sodium 136      Potassium 3.3 (*)     Chloride 103      Bicarbonate 26      Anion Gap 10      Urea Nitrogen 15      Creatinine 0.67      eGFR >90      Calcium 9.3      Albumin 4.2      Alkaline Phosphatase 79      Total Protein 7.7      AST 18      Bilirubin, Total 0.5      ALT 24     B-TYPE NATRIURETIC PEPTIDE - Abnormal     (*)     Narrative:        <100 pg/mL - Heart failure unlikely  100-299 pg/mL - Intermediate probability of acute heart                  failure exacerbation. Correlate with clinical                   context and patient history.    >=300 pg/mL - Heart Failure likely. Correlate with clinical                  context and patient history.    BNP testing is performed using different testing methodology at [facility] than at other [facility] hospitals. Direct result comparisons should only be made within the same method.      MAGNESIUM - Normal    Magnesium 1.98     LIPASE - Normal    Lipase 12      Narrative:     Venipuncture immediately after or during the administration of Metamizole may lead to falsely low results. Testing should be performed immediately prior to Metamizole dosing.   TROPONIN I, HIGH SENSITIVITY - Normal    Troponin I, High Sensitivity 10      Narrative:     Less than 99th percentile of normal range cutoff-  Female and children under 18 years old <14 ng/L; Male <21 ng/L: Negative  Repeat testing should be performed if clinically indicated.     Female and children under 18 years old 14-50 ng/L; Male 21-50 ng/L:  Consistent with possible cardiac damage and possible increased clinical   risk. Serial measurements may help to assess extent of myocardial damage.     >50 ng/L: Consistent with cardiac damage, increased clinical risk and  myocardial infarction. Serial measurements may help assess extent of   myocardial damage.      NOTE: Children less than 1 year old may have higher baseline troponin   levels and results should be interpreted in conjunction with the overall   clinical context.     NOTE: Troponin I testing is performed using a different   testing methodology at [facility] than at other   [facility]. Direct result comparisons should only   be made within the same method.     CT head wo IV contrast   Final Result   No acute intracranial abnormality.             MACRO:   None        Signed by: Chapincito Burnham 8/1/2024 10:01 PM   Dictation workstation:   [id number]          ED Course & [facility]   ED Course as of 08/01/24 2221   Thu Aug 01, 2024   2203  IMPRESSION:  No acute intracranial abnormality.       [CF]      ED Course User Index  [CF] Shelby Zaldivar MD       Medical Decision Making  This is a 29-year-old female who presents to the emergency department for hypertension.  Patient was in the 200s she did come down at 189 systolically with 20 of labetalol.  She reports that her chest pain is gone away.  Her EKG on my read shows sinus rhythm at a rate of 72 bpm no ST changes or elevation nonischemic EKG, QTc is 480, AR is 176.  Troponins are negative here. She has no signs of anemia no signs of kidney failure injury and CT of her head showed no acute pathology.  At this time will admit patient for her hypertension urgency        Procedure  Procedures     Shelby Zaldivar MD  08/01/24 7992

## 2024-08-02 NOTE — H&P
History Obtained From: patient     History Of Present Illness:  Bernadette Meeks is a 29 y.o. female with PMHx s/f HTN, obesity  presenting with elevated blood pressure.  The patient has a previous history of hypertension several years back first diagnosed during the time of her pregnancy.  At that time she was on labetalol 200 mg 3 times a day and Procardia 120 mg daily patient subsequently had her child and really did not follow-up on her hypertension.  She had presented to emergency department earlier today was given IV labetalol and offered admission patient declined went home she had prescription for labetalol but said on the prescription because she could not start till tomorrow she did not take any began to have some headache nausea and came back to the hospital.  Patient currently denies any chest pain any shortness of breath palpitations she admits to headache nausea dizziness denies abdominal pain back pain urinary symptoms.    ED Course (Summary):   Vitals on presentation: Initial blood pressure 212/147 post labetalol 189/109 heart rate 91 respiratory rate 16 temperature 98.1 SpO2 99% on room air  Labs: Chemistry panel sodium 136 potassium 3.3 chloride 103 CO2 26 BUN 15 creatinine 0.67 glucose 131 liver enzymes are within normal limits magnesium 1.98 BN peptide 140 CBC shows WBC 10.7 hemoglobin 13.9 hematocrit 41.5 platelets 344 imaging: Repeated troponins are 10  Interventions: Patient given 10 mg of IV labetalol referred for admission  4.   Imaging EKG shows sinus rhythm and LVH CT of head negative for acute intracranial abnormality chest x-ray shows lungs to be clear no acute cardiopulmonary process    ED Course:  ED Course as of 08/01/24 2245   Thu Aug 01, 2024   2205 IMPRESSION:  No acute intracranial abnormality.       [CF]      ED Course User Index  [CF] Shelby Zaldivar MD         Diagnoses as of 08/01/24 2245   Hypertensive urgency     Relevant Results  Results for orders placed or performed  during the hospital encounter of 08/01/24 (from the past 24 hour(s))   CBC and Auto Differential   Result Value Ref Range    WBC 10.7 4.4 - 11.3 x10*3/uL    nRBC 0.0 0.0 - 0.0 /100 WBCs    RBC 5.33 (H) 4.00 - 5.20 x10*6/uL    Hemoglobin 13.9 12.0 - 16.0 g/dL    Hematocrit 41.5 36.0 - 46.0 %    MCV 78 (L) 80 - 100 fL    MCH 26.1 26.0 - 34.0 pg    MCHC 33.5 32.0 - 36.0 g/dL    RDW 15.2 (H) 11.5 - 14.5 %    Platelets 344 150 - 450 x10*3/uL    Neutrophils % 69.6 40.0 - 80.0 %    Immature Granulocytes %, Automated 0.3 0.0 - 0.9 %    Lymphocytes % 24.0 13.0 - 44.0 %    Monocytes % 4.3 2.0 - 10.0 %    Eosinophils % 1.6 0.0 - 6.0 %    Basophils % 0.2 0.0 - 2.0 %    Neutrophils Absolute 7.45 1.20 - 7.70 x10*3/uL    Immature Granulocytes Absolute, Automated 0.03 0.00 - 0.70 x10*3/uL    Lymphocytes Absolute 2.57 1.20 - 4.80 x10*3/uL    Monocytes Absolute 0.46 0.10 - 1.00 x10*3/uL    Eosinophils Absolute 0.17 0.00 - 0.70 x10*3/uL    Basophils Absolute 0.02 0.00 - 0.10 x10*3/uL   Magnesium   Result Value Ref Range    Magnesium 1.98 1.60 - 2.40 mg/dL   Comprehensive metabolic panel   Result Value Ref Range    Glucose 131 (H) 74 - 99 mg/dL    Sodium 136 136 - 145 mmol/L    Potassium 3.3 (L) 3.5 - 5.3 mmol/L    Chloride 103 98 - 107 mmol/L    Bicarbonate 26 21 - 32 mmol/L    Anion Gap 10 10 - 20 mmol/L    Urea Nitrogen 15 6 - 23 mg/dL    Creatinine 0.67 0.50 - 1.05 mg/dL    eGFR >90 >60 mL/min/1.73m*2    Calcium 9.3 8.6 - 10.3 mg/dL    Albumin 4.2 3.4 - 5.0 g/dL    Alkaline Phosphatase 79 33 - 110 U/L    Total Protein 7.7 6.4 - 8.2 g/dL    AST 18 9 - 39 U/L    Bilirubin, Total 0.5 0.0 - 1.2 mg/dL    ALT 24 7 - 45 U/L   Lipase   Result Value Ref Range    Lipase 12 9 - 82 U/L   Troponin I, High Sensitivity   Result Value Ref Range    Troponin I, High Sensitivity 10 0 - 13 ng/L   B-Type Natriuretic Peptide   Result Value Ref Range     (H) 0 - 99 pg/mL      CT head wo IV contrast    Result Date: 8/1/2024  Interpreted By:  Chacha  Chapincito, STUDY: CT HEAD WO IV CONTRAST;  8/1/2024 9:15 pm   INDICATION: Signs/Symptoms:headache.   COMPARISON: None.   ACCESSION NUMBER(S): [id number]   ORDERING CLINICIAN: HASMUKH CARABALLO   TECHNIQUE: Axial non-contrast CT images of the head. Coronal and sagittal images were reconstructed.   FINDINGS: BRAIN PARENCHYMA: Normal brain volume. Gray-white matter differentiation is preserved.   VENTRICLES: The ventricles and sulci are within normal limits in size for brain volume. No midline shift. EXTRA-AXIAL SPACES: No abnormal extraaxial fluid collection. EXTRACRANIAL SOFT TISSUES: Within normal limits. PARANASAL SINUSES: The visualized paranasal sinuses are clear. MASTOIDS: Mastoid air cells are aerated. CALVARIUM: No depressed skull fracture. No destructive osseous lesion. VESSELS: Unremarkable.   OTHER FINDINGS: None.       No acute intracranial abnormality.     MACRO: None   Signed by: Chapincito Burnham 8/1/2024 10:01 PM Dictation workstation:   [id number]    ECG 12 lead    Result Date: 8/1/2024  Sinus rhythm Left atrial enlargement Left ventricular hypertrophy Borderline prolonged QT interval See ED provider note for full interpretation and clinical correlation Confirmed by Kayleigh Elmore ([id number]) on 8/1/2024 5:59:07 PM    XR chest 1 view    Result Date: 8/1/2024  Interpreted By:  Alea Jessica, STUDY: XR CHEST 1 VIEW;  8/1/2024 10:42 am   INDICATION: Signs/Symptoms:Chest Pain.   COMPARISON: None.   ACCESSION NUMBER(S): [id number]   ORDERING CLINICIAN: DAVID CUEVAS   FINDINGS: CARDIOMEDIASTINAL SILHOUETTE: Cardiomediastinal silhouette is normal in size and configuration.     LUNGS: Lungs are clear.   ABDOMEN: No remarkable upper abdominal findings.     BONES: No acute osseous changes.       No acute cardiopulmonary process.   MACRO: None   Signed by: Alea Jessica 8/1/2024 11:03 AM Dictation workstation:   [id number]     Scheduled medications:  acetaminophen, 975 mg, oral, Once  enoxaparin, 60 mg,  subcutaneous, q12h PJ  labetalol, 100 mg, oral, BID  NIFEdipine ER, 30 mg, oral, BID  [START ON 8/2/2024] polyethylene glycol, 17 g, oral, Daily  potassium chloride, 20 mEq, intravenous, Once  potassium chloride CR, 30 mEq, oral, Once  promethazine, 12.5 mg, intravenous, Once      Continuous medications:     PRN medications:  PRN medications: acetaminophen **OR** acetaminophen **OR** acetaminophen, hydrALAZINE, melatonin, ondansetron ODT **OR** ondansetron      Past Medical History  She has a past medical history of Hypertension.    Surgical History  She has no past surgical history on file.     Social History  She reports that she has never smoked. She has never used smokeless tobacco. No history on file for alcohol use and drug use.    Family History  No family history on file.     Allergies  Penicillin, Penicillins, and Amoxicillin    Code Status  Full Code     Review of Systems   Constitutional:  Positive for fatigue. Negative for appetite change, chills, fever and unexpected weight change.   HENT:  Negative for congestion, ear discharge, ear pain, mouth sores, nosebleeds, sinus pain, sore throat, trouble swallowing and voice change.    Eyes:  Negative for photophobia, pain, discharge, itching and visual disturbance.   Respiratory:  Negative for apnea, cough, choking, chest tightness, shortness of breath and wheezing.    Cardiovascular:  Negative for chest pain, palpitations and leg swelling.   Gastrointestinal:  Positive for nausea. Negative for abdominal distention, abdominal pain, blood in stool, constipation, diarrhea and vomiting.   Endocrine: Negative for cold intolerance, heat intolerance, polydipsia, polyphagia and polyuria.   Genitourinary:  Negative for decreased urine volume, difficulty urinating, dysuria, flank pain, frequency, hematuria and urgency.   Musculoskeletal:  Negative for arthralgias, back pain, gait problem, myalgias, neck pain and neck stiffness.   Skin:  Negative for color change,  pallor and wound.   Allergic/Immunologic: Negative for food allergies and immunocompromised state.   Neurological:  Positive for headaches. Negative for dizziness, tremors, seizures, syncope, speech difficulty, weakness, light-headedness and numbness.   Hematological:  Negative for adenopathy. Does not bruise/bleed easily.   Psychiatric/Behavioral:  Negative for confusion, dysphoric mood, hallucinations, sleep disturbance and suicidal ideas. The patient is not nervous/anxious.        Last Recorded Vitals  BP (!) 189/109 (BP Location: Left arm)   Pulse 80   Temp 36.8 °C (98.2 °F) (Temporal)   Resp 20   Wt 144 kg (318 lb)   SpO2 100%      Physical Exam  Vitals reviewed.   Constitutional:       General: She is not in acute distress.     Appearance: She is obese.   HENT:      Head: Normocephalic and atraumatic.      Right Ear: External ear normal.      Left Ear: External ear normal.      Nose: Nose normal.      Mouth/Throat:      Mouth: Mucous membranes are moist.      Pharynx: Oropharynx is clear.   Eyes:      General: No scleral icterus.     Extraocular Movements: Extraocular movements intact.      Conjunctiva/sclera: Conjunctivae normal.      Pupils: Pupils are equal, round, and reactive to light.   Neck:      Vascular: No carotid bruit.   Cardiovascular:      Rate and Rhythm: Normal rate and regular rhythm.      Pulses: Normal pulses.      Heart sounds: Normal heart sounds. No murmur heard.  Pulmonary:      Effort: Pulmonary effort is normal. No respiratory distress.      Breath sounds: Normal breath sounds. No wheezing, rhonchi or rales.   Chest:      Chest wall: No tenderness.   Abdominal:      General: Bowel sounds are normal. There is no distension.      Palpations: Abdomen is soft. There is no mass.      Tenderness: There is no abdominal tenderness. There is no rebound.   Musculoskeletal:         General: No swelling or deformity. Normal range of motion.      Cervical back: Normal range of motion.       Right lower leg: No edema.      Left lower leg: No edema.   Skin:     General: Skin is warm and dry.      Capillary Refill: Capillary refill takes less than 2 seconds.   Neurological:      General: No focal deficit present.      Mental Status: She is alert and oriented to person, place, and time.   Psychiatric:         Mood and Affect: Mood normal.         Behavior: Behavior normal.         Assessment/Plan   Principal Problem:    Hypertensive urgency    Hypertensive Urgency  1st diagnosed with HTN a few years back, was on labetolol 200 tid, procardia xl 120 daily  Gradually lost to follow up  Complains of headache  Will start back on labetalol 100mg po tid  Also start the procardia xl 30 bid  Advise outpt sleep study to evaluate for sleep apnea    Headache  Initial dose to treat, tylenol 975mg and promethazine    Hypokalemia  Replace and recheck    Obesity BMI 51.33  Pt has followed ketogenic diet and lost weight  Pt encouraged to continue       No new Assessment & Plan notes have been filed under this hospital service since the last note was generated.  Service: Internal Medicine          Sacha Khalil, APRN-CNP    Dragon dictation software was used to dictate this note and thus there may be minor errors in translation/transcription including garbled speech or misspellings. Please contact for clarification if needed.

## 2024-08-02 NOTE — PROGRESS NOTES
08/02/24 1032   Discharge Planning   Living Arrangements Spouse/significant other;Children   Support Systems Spouse/significant other;Children   Assistance Needed Independent   Type of Residence Private residence   Home or Post Acute Services None   Expected Discharge Disposition Home   Does the patient need discharge transport arranged? No   Financial Resource Strain   How hard is it for you to pay for the very basics like food, housing, medical care, and heating? Not hard   Housing Stability   In the last 12 months, was there a time when you were not able to pay the mortgage or rent on time? N   At any time in the past 12 months, were you homeless or living in a shelter (including now)? N   Transportation Needs   In the past 12 months, has lack of transportation kept you from medical appointments or from getting medications? no   In the past 12 months, has lack of transportation kept you from meetings, work, or from getting things needed for daily living? No     Patient is not active with a PCP, agreeable to PCP referral. Patient is from home with her daughter and fiance. Patient is independent with ambulation, self care, driving, shopping, and meals.  Patient prefers to return home with no needs upon discharge. [facility] will continue to follow for needs if they arise.

## 2024-08-02 NOTE — PROGRESS NOTES
Bernadette Meeks is a 29 y.o. female admitted for Hypertensive urgency. Pharmacy reviewed the patient's [id number] medications and allergies for accuracy.    The list below reflects the PTA list prior to pharmacy medication history. A summary a changes to the PTA medication list has been listed below. Please review each medication in order reconciliation for additional clarification and justification.    Source of information:  PATIENT    Medications added:    Medications modified:    Medications to be removed:    Medications of concern:  LABETALOL 100MGMG  1 BID (PT HAS NOT STARTED TAKING      Prior to Admission Medications   Prescriptions Last Dose Informant Patient Reported? Taking?   labetalol (Normodyne) 100 mg tablet   No No   Sig: Take 1 tablet (100 mg) by mouth 2 times a day.      Facility-Administered Medications: None       Nury Hennessy

## 2024-08-02 NOTE — CARE PLAN
The patient's goals for the shift include      The clinical goals for the shift include SBP <180    Over the shift, the patient did not make progress toward the following goals. Barriers to progression include history of hypertension. Recommendations to address these barriers include use of PRN medications.

## 2024-08-02 NOTE — CARE PLAN
The patient's goals for the shift include  Pt will remain HDS throughout shift.    The clinical goals for the shift include SBP <180    Over the shift, the patient did make progress toward the following goals. Barriers to progression include understanding. Recommendations to address these barriers include education.

## 2024-08-03 VITALS
HEART RATE: 70 BPM | SYSTOLIC BLOOD PRESSURE: 146 MMHG | RESPIRATION RATE: 16 BRPM | HEIGHT: 66 IN | OXYGEN SATURATION: 96 % | TEMPERATURE: 97.9 F | WEIGHT: 293 LBS | DIASTOLIC BLOOD PRESSURE: 88 MMHG | BODY MASS INDEX: 47.09 KG/M2

## 2024-08-03 PROBLEM — R51.9 ACUTE NONINTRACTABLE HEADACHE: Status: RESOLVED | Noted: 2024-08-01 | Resolved: 2024-08-03

## 2024-08-03 PROBLEM — E87.6 HYPOKALEMIA: Status: RESOLVED | Noted: 2024-08-01 | Resolved: 2024-08-03

## 2024-08-03 PROBLEM — I16.0 HYPERTENSIVE URGENCY: Status: RESOLVED | Noted: 2024-08-01 | Resolved: 2024-08-03

## 2024-08-03 PROCEDURE — 96376 TX/PRO/DX INJ SAME DRUG ADON: CPT

## 2024-08-03 PROCEDURE — 2500000004 HC RX 250 GENERAL PHARMACY W/ HCPCS (ALT 636 FOR OP/ED): Performed by: NURSE PRACTITIONER

## 2024-08-03 PROCEDURE — 2500000001 HC RX 250 WO HCPCS SELF ADMINISTERED DRUGS (ALT 637 FOR MEDICARE OP): Performed by: INTERNAL MEDICINE

## 2024-08-03 PROCEDURE — 99239 HOSP IP/OBS DSCHRG MGMT >30: CPT | Performed by: INTERNAL MEDICINE

## 2024-08-03 PROCEDURE — 96372 THER/PROPH/DIAG INJ SC/IM: CPT | Performed by: NURSE PRACTITIONER

## 2024-08-03 PROCEDURE — G0378 HOSPITAL OBSERVATION PER HR: HCPCS

## 2024-08-03 PROCEDURE — 2500000004 HC RX 250 GENERAL PHARMACY W/ HCPCS (ALT 636 FOR OP/ED): Performed by: INTERNAL MEDICINE

## 2024-08-03 RX ORDER — AMLODIPINE BESYLATE 10 MG/1
10 TABLET ORAL DAILY
Qty: 30 TABLET | Refills: 11 | Status: SHIPPED | OUTPATIENT
Start: 2024-08-04 | End: 2025-08-04

## 2024-08-03 RX ORDER — LISINOPRIL AND HYDROCHLOROTHIAZIDE 20; 25 MG/1; MG/1
1 TABLET ORAL DAILY
Qty: 30 TABLET | Refills: 11 | Status: SHIPPED | OUTPATIENT
Start: 2024-08-03 | End: 2025-08-03

## 2024-08-03 RX ORDER — HYDRALAZINE HYDROCHLORIDE 20 MG/ML
10 INJECTION INTRAMUSCULAR; INTRAVENOUS ONCE
Status: COMPLETED | OUTPATIENT
Start: 2024-08-03 | End: 2024-08-03

## 2024-08-03 RX ORDER — CARVEDILOL 12.5 MG/1
12.5 TABLET ORAL 2 TIMES DAILY
Qty: 60 TABLET | Refills: 11 | Status: SHIPPED | OUTPATIENT
Start: 2024-08-03 | End: 2025-08-03

## 2024-08-03 ASSESSMENT — COGNITIVE AND FUNCTIONAL STATUS - GENERAL
DAILY ACTIVITIY SCORE: 24
MOBILITY SCORE: 24

## 2024-08-03 ASSESSMENT — PAIN SCALES - GENERAL: PAINLEVEL_OUTOF10: 0 - NO PAIN

## 2024-08-03 NOTE — DISCHARGE SUMMARY
DISCHARGE SUMMARY     Discharge Diagnosis  Hypertensive urgency    This discharge took greater than 35 minutes.    Test Results Pending At Discharge  Pending Labs       No current pending labs.            Hospital Course   Bernadette Meeks is a 29 y.o. female with PMHx s/f HTN, obesity  presenting with elevated blood pressure.  The patient has a previous history of hypertension several years back first diagnosed during the time of her pregnancy.  At that time she was on labetalol 200 mg 3 times a day and Procardia 120 mg daily patient subsequently had her child and really did not follow-up on her hypertension.  She had presented to emergency department earlier today was given IV labetalol and offered admission patient declined went home she had prescription for labetalol but said on the prescription because she could not start till tomorrow she did not take any began to have some headache nausea and came back to the hospital.  Patient currently denies any chest pain any shortness of breath palpitations she admits to headache nausea dizziness denies abdominal pain back pain urinary symptoms.     ED Course (Summary):   Vitals on presentation: Initial blood pressure 212/147 post labetalol 189/109 heart rate 91 respiratory rate 16 temperature 98.1 SpO2 99% on room air  Labs: Chemistry panel sodium 136 potassium 3.3 chloride 103 CO2 26 BUN 15 creatinine 0.67 glucose 131 liver enzymes are within normal limits magnesium 1.98 BN peptide 140 CBC shows WBC 10.7 hemoglobin 13.9 hematocrit 41.5 platelets 344 imaging: Repeated troponins are 10  Interventions: Patient given 10 mg of IV labetalol referred for admission  4.   Imaging EKG shows sinus rhythm and LVH CT of head negative for acute intracranial abnormality chest x-ray shows lungs to be clear no acute cardiopulmonary process     ED Course:        ED Course as of 08/01/24 2245   u Aug 01, 2024   2205 IMPRESSION:  No acute intracranial abnormality.       [CF]       ED  "Course User Index  [CF] Shelby Zaldivar MD           Diagnoses as of 08/01/24 2245   Hypertensive urgency               08/02/2024: Patient was evaluated this morning. She is not in acute distress. She complains of a headache that is \"all over\" and some vision changes associated with the headache.     Hypertensive urgency/Essential HTN  -new regiment started   -Coreg 12.5mg PO  -Lisinopril 20mg PO/hydrochlorothiazide 25 mg PO  -Norvasc 10mg PO     Obesity BMI 51.33  -Pt has followed ketogenic diet and lost weight  -Pt encouraged to continue      Hypokalemia  -repleted PRN    Pertinent Physical Exam At Time of Discharge  Constitutional:       General: She is awake.      Appearance: Normal appearance. She is well-groomed. She is obese.   HENT:      Head: Normocephalic and atraumatic.   Eyes:      General: Vision grossly intact. Gaze aligned appropriately.      Extraocular Movements: Extraocular movements intact.      Conjunctiva/sclera: Conjunctivae normal.   Cardiovascular:      Rate and Rhythm: Normal rate and regular rhythm.      Pulses: Normal pulses.      Heart sounds: Normal heart sounds.   Pulmonary:      Effort: Pulmonary effort is normal.      Breath sounds: Normal breath sounds.   Abdominal:      General: Bowel sounds are normal.      Palpations: Abdomen is soft.   Neurological:      General: No focal deficit present.      Mental Status: She is alert and oriented to person, place, and time.   Psychiatric:         Attention and Perception: Attention and perception normal.         Mood and Affect: Mood and affect normal.         Speech: Speech normal.         Behavior: Behavior normal. Behavior is cooperative.         Thought Content: Thought content normal.         Cognition and Memory: Cognition and memory normal.         Judgment: Judgment normal.     Home Medications     Medication List      START taking these medications     amLODIPine 10 mg tablet; Commonly known as: Norvasc; Take 1 tablet (10   mg) " by mouth once daily.; Start taking on: August 4, 2024   carvedilol 12.5 mg tablet; Commonly known as: Coreg; Take 1 tablet (12.5   mg) by mouth 2 times a day.   lisinopriL-hydrochlorothiazide 20-25 mg tablet; Take 1 tablet by mouth   once daily.     STOP taking these medications     labetalol 100 mg tablet; Commonly known as: Normodyne       Outpatient Follow-Up  No follow-ups on file.     Juvenal Cerda MD PhD  8/3/2024  8:48 AM

## 2024-08-03 NOTE — SIGNIFICANT EVENT
Please see initial H&P and subsequent progress notes with regards to presentation and management thus far.  Patient had presented with hypertensive urgency.  Patient was accordingly restarted on antihypertensives and is currently on initially on labetalol and Procardia as she was initially on but was transition to Coreg, lisinopril, Norvasc, hydrochlorothiazide today.  It should be noted that in the past when she was on labetalol and Procardia but was lost to follow-up.  This evening nursing staff and noted that the patient was having a progressively worsening headache from throughout the day and it was quite uncomfortable rating it as a 8 out of 10.  CT imaging on presentation was unremarkable for any acute intracranial processes but given the new acute findings and her presentation of profound hypertension a repeat CT was performed which was also unremarkable.  Patient remains quite hypertensive since about the mid afternoon from about 150s to 190s/80s to 110s  Will go ahead and give the patient an additional dose of hydralazine 10 mg IV and continue to adjust accordingly throughout the day.

## 2024-08-03 NOTE — CARE PLAN
The patient's goals for the shift include      The clinical goals for the shift include SBP <160    Over the shift, the patient did not make progress toward the following goals. Barriers to progression include history of HTN. Recommendations to address these barriers include frequent monitoring.

## 2024-08-05 ENCOUNTER — APPOINTMENT (OUTPATIENT)
Dept: CARDIOLOGY | Facility: HOSPITAL | Age: 29
End: 2024-08-05
Payer: COMMERCIAL

## 2024-08-05 ENCOUNTER — HOSPITAL ENCOUNTER (INPATIENT)
Facility: HOSPITAL | Age: 29
LOS: 1 days | Discharge: HOME | End: 2024-08-07
Attending: STUDENT IN AN ORGANIZED HEALTH CARE EDUCATION/TRAINING PROGRAM | Admitting: STUDENT IN AN ORGANIZED HEALTH CARE EDUCATION/TRAINING PROGRAM
Payer: COMMERCIAL

## 2024-08-05 ENCOUNTER — APPOINTMENT (OUTPATIENT)
Dept: RADIOLOGY | Facility: HOSPITAL | Age: 29
End: 2024-08-05
Payer: COMMERCIAL

## 2024-08-05 DIAGNOSIS — I95.9 HYPOTENSION, UNSPECIFIED HYPOTENSION TYPE: ICD-10-CM

## 2024-08-05 DIAGNOSIS — I10 PRIMARY HYPERTENSION: ICD-10-CM

## 2024-08-05 DIAGNOSIS — R42 LIGHTHEADEDNESS: ICD-10-CM

## 2024-08-05 DIAGNOSIS — I10 ESSENTIAL (PRIMARY) HYPERTENSION: ICD-10-CM

## 2024-08-05 DIAGNOSIS — N17.9 AKI (ACUTE KIDNEY INJURY) (CMS-HCC): Primary | ICD-10-CM

## 2024-08-05 DIAGNOSIS — I16.0 HYPERTENSIVE URGENCY: ICD-10-CM

## 2024-08-05 LAB
ALBUMIN SERPL BCP-MCNC: 4.4 G/DL (ref 3.4–5)
ALP SERPL-CCNC: 66 U/L (ref 33–110)
ALT SERPL W P-5'-P-CCNC: 33 U/L (ref 7–45)
ANION GAP SERPL CALC-SCNC: 14 MMOL/L (ref 10–20)
AST SERPL W P-5'-P-CCNC: 16 U/L (ref 9–39)
BASOPHILS # BLD AUTO: 0.04 X10*3/UL (ref 0–0.1)
BASOPHILS NFR BLD AUTO: 0.3 %
BILIRUB SERPL-MCNC: 0.5 MG/DL (ref 0–1.2)
BUN SERPL-MCNC: 34 MG/DL (ref 6–23)
CALCIUM SERPL-MCNC: 9.7 MG/DL (ref 8.6–10.3)
CARDIAC TROPONIN I PNL SERPL HS: 8 NG/L (ref 0–13)
CHLORIDE SERPL-SCNC: 101 MMOL/L (ref 98–107)
CO2 SERPL-SCNC: 23 MMOL/L (ref 21–32)
CREAT SERPL-MCNC: 2.09 MG/DL (ref 0.5–1.05)
EGFRCR SERPLBLD CKD-EPI 2021: 32 ML/MIN/1.73M*2
EOSINOPHIL # BLD AUTO: 0.19 X10*3/UL (ref 0–0.7)
EOSINOPHIL NFR BLD AUTO: 1.6 %
ERYTHROCYTE [DISTWIDTH] IN BLOOD BY AUTOMATED COUNT: 15.5 % (ref 11.5–14.5)
GLUCOSE SERPL-MCNC: 87 MG/DL (ref 74–99)
HCT VFR BLD AUTO: 44.9 % (ref 36–46)
HGB BLD-MCNC: 14.8 G/DL (ref 12–16)
IMM GRANULOCYTES # BLD AUTO: 0.06 X10*3/UL (ref 0–0.7)
IMM GRANULOCYTES NFR BLD AUTO: 0.5 % (ref 0–0.9)
LYMPHOCYTES # BLD AUTO: 3.33 X10*3/UL (ref 1.2–4.8)
LYMPHOCYTES NFR BLD AUTO: 27.5 %
MCH RBC QN AUTO: 26.1 PG (ref 26–34)
MCHC RBC AUTO-ENTMCNC: 33 G/DL (ref 32–36)
MCV RBC AUTO: 79 FL (ref 80–100)
MONOCYTES # BLD AUTO: 0.9 X10*3/UL (ref 0.1–1)
MONOCYTES NFR BLD AUTO: 7.4 %
NEUTROPHILS # BLD AUTO: 7.58 X10*3/UL (ref 1.2–7.7)
NEUTROPHILS NFR BLD AUTO: 62.7 %
NRBC BLD-RTO: 0 /100 WBCS (ref 0–0)
PLATELET # BLD AUTO: 394 X10*3/UL (ref 150–450)
POTASSIUM SERPL-SCNC: 3.6 MMOL/L (ref 3.5–5.3)
PROT SERPL-MCNC: 7.7 G/DL (ref 6.4–8.2)
RBC # BLD AUTO: 5.67 X10*6/UL (ref 4–5.2)
SODIUM SERPL-SCNC: 134 MMOL/L (ref 136–145)
WBC # BLD AUTO: 12.1 X10*3/UL (ref 4.4–11.3)

## 2024-08-05 PROCEDURE — 71045 X-RAY EXAM CHEST 1 VIEW: CPT | Performed by: STUDENT IN AN ORGANIZED HEALTH CARE EDUCATION/TRAINING PROGRAM

## 2024-08-05 PROCEDURE — 71045 X-RAY EXAM CHEST 1 VIEW: CPT

## 2024-08-05 PROCEDURE — 93005 ELECTROCARDIOGRAM TRACING: CPT

## 2024-08-05 PROCEDURE — 84484 ASSAY OF TROPONIN QUANT: CPT | Performed by: EMERGENCY MEDICINE

## 2024-08-05 PROCEDURE — 80053 COMPREHEN METABOLIC PANEL: CPT | Performed by: EMERGENCY MEDICINE

## 2024-08-05 PROCEDURE — 36415 COLL VENOUS BLD VENIPUNCTURE: CPT | Performed by: EMERGENCY MEDICINE

## 2024-08-05 PROCEDURE — 99285 EMERGENCY DEPT VISIT HI MDM: CPT

## 2024-08-05 PROCEDURE — 85025 COMPLETE CBC W/AUTO DIFF WBC: CPT | Performed by: EMERGENCY MEDICINE

## 2024-08-05 ASSESSMENT — COLUMBIA-SUICIDE SEVERITY RATING SCALE - C-SSRS
6. HAVE YOU EVER DONE ANYTHING, STARTED TO DO ANYTHING, OR PREPARED TO DO ANYTHING TO END YOUR LIFE?: NO
1. IN THE PAST MONTH, HAVE YOU WISHED YOU WERE DEAD OR WISHED YOU COULD GO TO SLEEP AND NOT WAKE UP?: NO
2. HAVE YOU ACTUALLY HAD ANY THOUGHTS OF KILLING YOURSELF?: NO

## 2024-08-05 ASSESSMENT — LIFESTYLE VARIABLES
EVER HAD A DRINK FIRST THING IN THE MORNING TO STEADY YOUR NERVES TO GET RID OF A HANGOVER: NO
EVER FELT BAD OR GUILTY ABOUT YOUR DRINKING: NO
HAVE PEOPLE ANNOYED YOU BY CRITICIZING YOUR DRINKING: NO
TOTAL SCORE: 0
HAVE YOU EVER FELT YOU SHOULD CUT DOWN ON YOUR DRINKING: NO

## 2024-08-05 ASSESSMENT — PAIN DESCRIPTION - LOCATION: LOCATION: CHEST

## 2024-08-05 ASSESSMENT — PAIN DESCRIPTION - PAIN TYPE: TYPE: ACUTE PAIN

## 2024-08-05 ASSESSMENT — PAIN - FUNCTIONAL ASSESSMENT: PAIN_FUNCTIONAL_ASSESSMENT: 0-10

## 2024-08-05 ASSESSMENT — PAIN SCALES - GENERAL: PAINLEVEL_OUTOF10: 5 - MODERATE PAIN

## 2024-08-05 ASSESSMENT — PAIN DESCRIPTION - FREQUENCY: FREQUENCY: CONSTANT/CONTINUOUS

## 2024-08-05 ASSESSMENT — PAIN DESCRIPTION - DESCRIPTORS: DESCRIPTORS: TIGHTNESS

## 2024-08-06 PROBLEM — E66.01 CLASS 3 SEVERE OBESITY IN ADULT (MULTI): Status: ACTIVE | Noted: 2024-08-06

## 2024-08-06 PROBLEM — E66.813 CLASS 3 SEVERE OBESITY IN ADULT: Status: ACTIVE | Noted: 2024-08-06

## 2024-08-06 PROBLEM — I95.9 HYPOTENSION: Status: ACTIVE | Noted: 2024-08-06

## 2024-08-06 PROBLEM — D72.829 LEUKOCYTOSIS: Status: ACTIVE | Noted: 2024-08-06

## 2024-08-06 PROBLEM — N17.9 AKI (ACUTE KIDNEY INJURY) (CMS-HCC): Status: ACTIVE | Noted: 2024-08-06

## 2024-08-06 LAB
ANION GAP SERPL CALC-SCNC: 14 MMOL/L (ref 10–20)
BUN SERPL-MCNC: 33 MG/DL (ref 6–23)
CALCIUM SERPL-MCNC: 9 MG/DL (ref 8.6–10.3)
CARDIAC TROPONIN I PNL SERPL HS: 7 NG/L (ref 0–13)
CHLORIDE SERPL-SCNC: 103 MMOL/L (ref 98–107)
CO2 SERPL-SCNC: 21 MMOL/L (ref 21–32)
CREAT SERPL-MCNC: 1.74 MG/DL (ref 0.5–1.05)
EGFRCR SERPLBLD CKD-EPI 2021: 40 ML/MIN/1.73M*2
ERYTHROCYTE [DISTWIDTH] IN BLOOD BY AUTOMATED COUNT: 15.2 % (ref 11.5–14.5)
GLUCOSE SERPL-MCNC: 102 MG/DL (ref 74–99)
HCT VFR BLD AUTO: 39.1 % (ref 36–46)
HGB BLD-MCNC: 13.3 G/DL (ref 12–16)
MAGNESIUM SERPL-MCNC: 2.04 MG/DL (ref 1.6–2.4)
MCH RBC QN AUTO: 26.6 PG (ref 26–34)
MCHC RBC AUTO-ENTMCNC: 34 G/DL (ref 32–36)
MCV RBC AUTO: 78 FL (ref 80–100)
NRBC BLD-RTO: 0 /100 WBCS (ref 0–0)
PLATELET # BLD AUTO: 329 X10*3/UL (ref 150–450)
POTASSIUM SERPL-SCNC: 3.2 MMOL/L (ref 3.5–5.3)
RBC # BLD AUTO: 5 X10*6/UL (ref 4–5.2)
SODIUM SERPL-SCNC: 135 MMOL/L (ref 136–145)
WBC # BLD AUTO: 11.5 X10*3/UL (ref 4.4–11.3)

## 2024-08-06 PROCEDURE — 85027 COMPLETE CBC AUTOMATED: CPT | Performed by: STUDENT IN AN ORGANIZED HEALTH CARE EDUCATION/TRAINING PROGRAM

## 2024-08-06 PROCEDURE — 83735 ASSAY OF MAGNESIUM: CPT | Performed by: REGISTERED NURSE

## 2024-08-06 PROCEDURE — 2500000002 HC RX 250 W HCPCS SELF ADMINISTERED DRUGS (ALT 637 FOR MEDICARE OP, ALT 636 FOR OP/ED): Performed by: REGISTERED NURSE

## 2024-08-06 PROCEDURE — 96372 THER/PROPH/DIAG INJ SC/IM: CPT | Performed by: STUDENT IN AN ORGANIZED HEALTH CARE EDUCATION/TRAINING PROGRAM

## 2024-08-06 PROCEDURE — 2500000004 HC RX 250 GENERAL PHARMACY W/ HCPCS (ALT 636 FOR OP/ED): Performed by: HOSPITALIST

## 2024-08-06 PROCEDURE — 36415 COLL VENOUS BLD VENIPUNCTURE: CPT | Performed by: EMERGENCY MEDICINE

## 2024-08-06 PROCEDURE — 2500000004 HC RX 250 GENERAL PHARMACY W/ HCPCS (ALT 636 FOR OP/ED): Performed by: STUDENT IN AN ORGANIZED HEALTH CARE EDUCATION/TRAINING PROGRAM

## 2024-08-06 PROCEDURE — 36415 COLL VENOUS BLD VENIPUNCTURE: CPT | Performed by: STUDENT IN AN ORGANIZED HEALTH CARE EDUCATION/TRAINING PROGRAM

## 2024-08-06 PROCEDURE — 96360 HYDRATION IV INFUSION INIT: CPT | Mod: 59

## 2024-08-06 PROCEDURE — 99223 1ST HOSP IP/OBS HIGH 75: CPT | Performed by: STUDENT IN AN ORGANIZED HEALTH CARE EDUCATION/TRAINING PROGRAM

## 2024-08-06 PROCEDURE — 80048 BASIC METABOLIC PNL TOTAL CA: CPT | Performed by: STUDENT IN AN ORGANIZED HEALTH CARE EDUCATION/TRAINING PROGRAM

## 2024-08-06 PROCEDURE — 2500000001 HC RX 250 WO HCPCS SELF ADMINISTERED DRUGS (ALT 637 FOR MEDICARE OP): Performed by: STUDENT IN AN ORGANIZED HEALTH CARE EDUCATION/TRAINING PROGRAM

## 2024-08-06 PROCEDURE — 1210000001 HC SEMI-PRIVATE ROOM DAILY

## 2024-08-06 PROCEDURE — 84484 ASSAY OF TROPONIN QUANT: CPT | Performed by: EMERGENCY MEDICINE

## 2024-08-06 RX ORDER — AMLODIPINE BESYLATE 10 MG/1
10 TABLET ORAL DAILY
Status: DISCONTINUED | OUTPATIENT
Start: 2024-08-06 | End: 2024-08-07 | Stop reason: HOSPADM

## 2024-08-06 RX ORDER — BISACODYL 5 MG
10 TABLET, DELAYED RELEASE (ENTERIC COATED) ORAL DAILY PRN
Status: DISCONTINUED | OUTPATIENT
Start: 2024-08-06 | End: 2024-08-07 | Stop reason: HOSPADM

## 2024-08-06 RX ORDER — TALC
3 POWDER (GRAM) TOPICAL NIGHTLY PRN
Status: DISCONTINUED | OUTPATIENT
Start: 2024-08-06 | End: 2024-08-07 | Stop reason: HOSPADM

## 2024-08-06 RX ORDER — ONDANSETRON HYDROCHLORIDE 2 MG/ML
4 INJECTION, SOLUTION INTRAVENOUS EVERY 8 HOURS PRN
Status: DISCONTINUED | OUTPATIENT
Start: 2024-08-06 | End: 2024-08-07 | Stop reason: HOSPADM

## 2024-08-06 RX ORDER — ONDANSETRON 4 MG/1
4 TABLET, FILM COATED ORAL EVERY 8 HOURS PRN
Status: DISCONTINUED | OUTPATIENT
Start: 2024-08-06 | End: 2024-08-07 | Stop reason: HOSPADM

## 2024-08-06 RX ORDER — GUAIFENESIN 600 MG/1
600 TABLET, EXTENDED RELEASE ORAL EVERY 12 HOURS PRN
Status: DISCONTINUED | OUTPATIENT
Start: 2024-08-06 | End: 2024-08-07 | Stop reason: HOSPADM

## 2024-08-06 RX ORDER — BISACODYL 10 MG/1
10 SUPPOSITORY RECTAL DAILY PRN
Status: DISCONTINUED | OUTPATIENT
Start: 2024-08-06 | End: 2024-08-07 | Stop reason: HOSPADM

## 2024-08-06 RX ORDER — SODIUM CHLORIDE 9 MG/ML
100 INJECTION, SOLUTION INTRAVENOUS CONTINUOUS
Status: ACTIVE | OUTPATIENT
Start: 2024-08-06 | End: 2024-08-07

## 2024-08-06 RX ORDER — POTASSIUM CHLORIDE 750 MG/1
20 TABLET, FILM COATED, EXTENDED RELEASE ORAL ONCE
Status: COMPLETED | OUTPATIENT
Start: 2024-08-06 | End: 2024-08-06

## 2024-08-06 RX ORDER — SODIUM CHLORIDE 9 MG/ML
100 INJECTION, SOLUTION INTRAVENOUS CONTINUOUS
Status: ACTIVE | OUTPATIENT
Start: 2024-08-06 | End: 2024-08-06

## 2024-08-06 RX ORDER — CARVEDILOL 12.5 MG/1
12.5 TABLET ORAL 2 TIMES DAILY
Status: DISCONTINUED | OUTPATIENT
Start: 2024-08-06 | End: 2024-08-07 | Stop reason: HOSPADM

## 2024-08-06 RX ORDER — ENOXAPARIN SODIUM 100 MG/ML
40 INJECTION SUBCUTANEOUS EVERY 12 HOURS SCHEDULED
Status: DISCONTINUED | OUTPATIENT
Start: 2024-08-06 | End: 2024-08-07 | Stop reason: HOSPADM

## 2024-08-06 SDOH — SOCIAL STABILITY: SOCIAL INSECURITY: DO YOU FEEL UNSAFE GOING BACK TO THE PLACE WHERE YOU ARE LIVING?: NO

## 2024-08-06 SDOH — SOCIAL STABILITY: SOCIAL INSECURITY: ARE YOU OR HAVE YOU BEEN THREATENED OR ABUSED PHYSICALLY, EMOTIONALLY, OR SEXUALLY BY ANYONE?: NO

## 2024-08-06 SDOH — SOCIAL STABILITY: SOCIAL INSECURITY: HAVE YOU HAD THOUGHTS OF HARMING ANYONE ELSE?: NO

## 2024-08-06 SDOH — SOCIAL STABILITY: SOCIAL INSECURITY: ARE THERE ANY APPARENT SIGNS OF INJURIES/BEHAVIORS THAT COULD BE RELATED TO ABUSE/NEGLECT?: NO

## 2024-08-06 SDOH — SOCIAL STABILITY: SOCIAL INSECURITY: WERE YOU ABLE TO COMPLETE ALL THE BEHAVIORAL HEALTH SCREENINGS?: YES

## 2024-08-06 SDOH — SOCIAL STABILITY: SOCIAL INSECURITY: ABUSE: ADULT

## 2024-08-06 SDOH — SOCIAL STABILITY: SOCIAL INSECURITY: HAS ANYONE EVER THREATENED TO HURT YOUR FAMILY OR YOUR PETS?: NO

## 2024-08-06 SDOH — SOCIAL STABILITY: SOCIAL INSECURITY: DOES ANYONE TRY TO KEEP YOU FROM HAVING/CONTACTING OTHER FRIENDS OR DOING THINGS OUTSIDE YOUR HOME?: NO

## 2024-08-06 SDOH — SOCIAL STABILITY: SOCIAL INSECURITY: HAVE YOU HAD ANY THOUGHTS OF HARMING ANYONE ELSE?: NO

## 2024-08-06 SDOH — SOCIAL STABILITY: SOCIAL INSECURITY: DO YOU FEEL ANYONE HAS EXPLOITED OR TAKEN ADVANTAGE OF YOU FINANCIALLY OR OF YOUR PERSONAL PROPERTY?: NO

## 2024-08-06 ASSESSMENT — ENCOUNTER SYMPTOMS
TREMORS: 0
SPEECH DIFFICULTY: 0
NUMBNESS: 0
HEMATURIA: 0
DIFFICULTY URINATING: 0
CONSTIPATION: 0
DIAPHORESIS: 0
WEAKNESS: 0
WOUND: 0
DIZZINESS: 1
ACTIVITY CHANGE: 0
SINUS PAIN: 0
APNEA: 0
SEIZURES: 0
ARTHRALGIAS: 0
NERVOUS/ANXIOUS: 0
NAUSEA: 1
CONFUSION: 0
FLANK PAIN: 0
COLOR CHANGE: 0
JOINT SWELLING: 0
PALPITATIONS: 0
BACK PAIN: 0
SORE THROAT: 0
DECREASED CONCENTRATION: 0
CHILLS: 0
APPETITE CHANGE: 0
CHEST TIGHTNESS: 0
ABDOMINAL DISTENTION: 0
SHORTNESS OF BREATH: 0
DYSURIA: 0
LIGHT-HEADEDNESS: 1
VOMITING: 0
AGITATION: 0
DIARRHEA: 0
COUGH: 0
WHEEZING: 0
FACIAL ASYMMETRY: 0
HEADACHES: 0
STRIDOR: 0
FEVER: 0
ABDOMINAL PAIN: 0
FREQUENCY: 0
MYALGIAS: 0
RHINORRHEA: 0
FATIGUE: 0

## 2024-08-06 ASSESSMENT — COGNITIVE AND FUNCTIONAL STATUS - GENERAL
MOBILITY SCORE: 24
DAILY ACTIVITIY SCORE: 24
PATIENT BASELINE BEDBOUND: NO
DAILY ACTIVITIY SCORE: 24
DAILY ACTIVITIY SCORE: 24
MOBILITY SCORE: 24
MOBILITY SCORE: 24

## 2024-08-06 ASSESSMENT — ACTIVITIES OF DAILY LIVING (ADL)
LACK_OF_TRANSPORTATION: NO
TOILETING: INDEPENDENT
JUDGMENT_ADEQUATE_SAFELY_COMPLETE_DAILY_ACTIVITIES: YES
DRESSING YOURSELF: INDEPENDENT
GROOMING: INDEPENDENT
WALKS IN HOME: INDEPENDENT
BATHING: INDEPENDENT
HEARING - RIGHT EAR: FUNCTIONAL
FEEDING YOURSELF: INDEPENDENT
HEARING - LEFT EAR: FUNCTIONAL
PATIENT'S MEMORY ADEQUATE TO SAFELY COMPLETE DAILY ACTIVITIES?: YES
ADEQUATE_TO_COMPLETE_ADL: YES

## 2024-08-06 ASSESSMENT — PATIENT HEALTH QUESTIONNAIRE - PHQ9
SUM OF ALL RESPONSES TO PHQ9 QUESTIONS 1 & 2: 0
1. LITTLE INTEREST OR PLEASURE IN DOING THINGS: NOT AT ALL
2. FEELING DOWN, DEPRESSED OR HOPELESS: NOT AT ALL

## 2024-08-06 ASSESSMENT — LIFESTYLE VARIABLES
HOW OFTEN DO YOU HAVE A DRINK CONTAINING ALCOHOL: MONTHLY OR LESS
AUDIT-C TOTAL SCORE: 1
PRESCIPTION_ABUSE_PAST_12_MONTHS: NO
AUDIT-C TOTAL SCORE: 1
SUBSTANCE_ABUSE_PAST_12_MONTHS: NO
SKIP TO QUESTIONS 9-10: 1
HOW OFTEN DO YOU HAVE 6 OR MORE DRINKS ON ONE OCCASION: NEVER
HOW MANY STANDARD DRINKS CONTAINING ALCOHOL DO YOU HAVE ON A TYPICAL DAY: 1 OR 2

## 2024-08-06 ASSESSMENT — PAIN SCALES - GENERAL
PAINLEVEL_OUTOF10: 0 - NO PAIN
PAINLEVEL_OUTOF10: 0 - NO PAIN

## 2024-08-06 ASSESSMENT — PAIN - FUNCTIONAL ASSESSMENT: PAIN_FUNCTIONAL_ASSESSMENT: 0-10

## 2024-08-06 NOTE — CARE PLAN
Problem: Pain - Adult  Goal: Verbalizes/displays adequate comfort level or baseline comfort level  Outcome: Progressing     Problem: Safety - Adult  Goal: Free from fall injury  Outcome: Progressing     Problem: Discharge Planning  Goal: Discharge to home or other facility with appropriate resources  Outcome: Progressing     Problem: Chronic Conditions and Co-morbidities  Goal: Patient's chronic conditions and co-morbidity symptoms are monitored and maintained or improved  Outcome: Progressing   The patient's goals for the shift include pt will remain hemodynamically stable this shift.    The clinical goals for the shift include pt will verbalize no pain this shift.

## 2024-08-06 NOTE — PROGRESS NOTES
Bernadette Meeks [id number]   Service: Internal Medicine / Hospitalist Date of service: 8/6/2024                          Full Code                     Subjective    History Of Present Illness:  Bernadette Meeks is a 29 y.o. female with PMHx s/f HTN, obesity presenting with hypotension. Pt was just discharged from this hospital three days after after being treated for hypertensive urgency with blood pressures initially 212/147 on arrival. At that time her home medications were labetalol 200 mg TID and Procardia 120 mg daily. She was switched to Coreg 12.5 mg daily, Lisinopril 20 mg/hydrochlorothiazide 25 mg, and Norvasc 10 mg daily and discharged on this. Unfortunately earlier yesterday she became hypotensive with SBP around 107. She was having lightheadedness, dizziness, nausea and some chest pain. Her symptoms have improved now that her blood pressure has increased. No syncope, falls, headache, numbness, abdominal pain or other symptoms. She feels her blood pressure was dropped too low as a result of her medication change. She also admits that she has not been eating or drinking very much the last few days.     ED Course (Summary):   Vitals on presentation: 97.5 F, 86 bpm, 20 rr, 112/76, 99% on RA  Labs: CMP Na 134, BUN 34, Cr 2.09  Trop 8 --> 7  CBC WBC 12.1, Hg 14.8, Plt 394  Imaging: CXR - Cardiomegaly without evidence of acute disease in the chest.   EKG - Sinus rhythm at 83 bpm, incomplete LBBB  Interventions: Admission for management of hypotension      8/6...........Patient reported that she was feeling better today.  She reported lightheadedness and dizziness yesterday.  Denies prior history of kidney disease or NSAID use.  No reported: Palpitations, dyspnea, orthopnea, fevers or chills.      Review of Systems:   Review of system otherwise negative if not aforementioned above in subjective.    Objective              Physical Exam     Constitutional:       Appearance: Patient appeared in no acute cardiopulmonary  distress.     Comments: Patient alert and oriented to person place time and situation.  HEENT:      Head: Normocephalic and atraumatic.Trachea midline      Nose:No observed congestion or rhinorrhea.     Mouth/Throat: Mucous membranes Moist, Trachea appeared  midline.  Eyes:      Extraocular Movements: Extraocular movements intact.      Pupils: Pupils are equal, round, and reactive to light.      Comments: No scleral icterus or conjunctival injection appreciated.   Cardiovascular:      Rate and Rhythm: Normal rate and regular rhythm. No clicks rubs or gallops, normal S1 and S2.No peripheral stigmata of endocarditis appreciated.     Pulmonary:      Lungs appeared clear to auscultation, no adventitious sound appreciated.  Abdominal:      General: Abdomen soft, obese, nontender, active bowel sounds, no involuntary guarding or rebound tenderness appreciated.     Comments: None   Musculoskeletal:       Patient appeared to have full active range of motion for upper and lower extremities, no acute apparent joint deformity appreciated on examination.   No pitting edema or cyanosis appreciated.       Lymphadenopathy:      No appreciable palpable lymphadenopathy  Skin:     General: Skin is warm.      Coloration:  No jaundice     Findings: No abnormal appearing skin rashes or lesions that appeared acute noted on unclothed area of the skin..   Neurological:      General: No focal sensory or motor deficits appreciated, no meningeal signs or dysmetria noted.      Cranial Nerves: Cranial nerves II to XII appearing grossly intact.     Genitals:  Deferred  Psychiatric:         The patient appears to be displaying normal mood and affect at the time of evaluation.    Labs:     Lab Results   Component Value Date    GLUCOSE 102 (H) 08/06/2024    CALCIUM 9.0 08/06/2024     (L) 08/06/2024    K 3.2 (L) 08/06/2024    CO2 21 08/06/2024     08/06/2024    BUN 33 (H) 08/06/2024    CREATININE 1.74 (H) 08/06/2024      Lab Results    Component Value Date    WBC 11.5 (H) 08/06/2024    HGB 13.3 08/06/2024    HCT 39.1 08/06/2024    MCV 78 (L) 08/06/2024     08/06/2024      [unfilled]   [unfilled]   No results found for the last 90 days.              X-rays/ Images    [unfilled]   Radiology Results (last 21 days)    Procedure Component Value Units Date/Time   XR chest 1 view [[id number]] Collected: 08/05/24 2301   Order Status: Completed Updated: 08/05/24 2301   Narrative:     Interpreted By:  Leonid Hanna,  STUDY:  XR CHEST 1 VIEW;  8/5/2024 10:29 pm      INDICATION:  Signs/Symptoms:Chest Pain.      COMPARISON:  08/01/2024      ACCESSION NUMBER(S):  [id number]      ORDERING CLINICIAN:  RAUDEL DRUMMOND      FINDINGS:          Cardiomegaly, particular due to left ventricular enlargement. The  pulmonary vasculature is within normal limits. No consolidation,  pleural effusion or pneumothorax.           Impression:     Cardiomegaly without evidence of acute disease in the chest.          MACRO:  None.      Signed by: Leonid Hanna 8/5/2024 11:00 PM  Dictation workstation:   [id number]   CT head wo IV contrast [[id number]] Collected: 08/03/24 0001   Order Status: Completed Updated: 08/03/24 0001   Narrative:     Interpreted By:  Preston Singh,  STUDY:  CT HEAD WO IV CONTRAST;  8/2/2024 10:10 pm      INDICATION:  Signs/Symptoms:Worsening Headache, hypertensive emergency on  presentation.      COMPARISON:  8/1/2024      ACCESSION NUMBER(S):  [id number]      ORDERING CLINICIAN:  KALPANA CABELLO      TECHNIQUE:  Contiguous axial images of the head were obtained without intravenous  contrast.      FINDINGS:  BRAIN PARENCHYMA:   The gray white matter differentiation is  preserved.  No mass effect or midline shift.      HEMORRHAGE:  No evidence of acute intracranial hemorrhage.  VENTRICLES AND EXTRA-AXIAL SPACES:  The ventricles are within normal  limits in size for brain volume.  No evidence of abnormal extraaxial  fluid  collection. EXTRACRANIAL SOFT TISSUES:  Within normal limits.  PARANASAL SINUSES/MASTOIDS:  The visualized paranasal sinuses and  mastoid air cells are clear and well pneumatized. CALVARIUM:  No  evidence of depressed calvarial fracture.      OTHER FINDINGS:  None       Impression:     No evidence of acute intracranial hemorrhage or mass effect.              MACRO:  None      Signed by: Preston Singh 8/2/2024 11:59 PM  Dictation workstation:   [id number]   CT head wo IV contrast [[id number]] Collected: 08/01/24 2202   Order Status: Completed Updated: 08/01/24 2202   Narrative:     Interpreted By:  Chapincito Burnham,  STUDY:  CT HEAD WO IV CONTRAST;  8/1/2024 9:15 pm      INDICATION:  Signs/Symptoms:headache.      COMPARISON:  None.      ACCESSION NUMBER(S):  [id number]      ORDERING CLINICIAN:  HASMUKH CARABALLO      TECHNIQUE:  Axial non-contrast CT images of the head. Coronal and sagittal images  were reconstructed.      FINDINGS:  BRAIN PARENCHYMA: Normal brain volume. Gray-white matter  differentiation is preserved.      VENTRICLES: The ventricles and sulci are within normal limits in size  for brain volume. No midline shift. EXTRA-AXIAL SPACES: No abnormal  extraaxial fluid collection. EXTRACRANIAL SOFT TISSUES: Within normal  limits. PARANASAL SINUSES: The visualized paranasal sinuses are clear.  MASTOIDS: Mastoid air cells are aerated.  CALVARIUM: No depressed skull fracture. No destructive osseous lesion.  VESSELS: Unremarkable.      OTHER FINDINGS: None.       Impression:     No acute intracranial abnormality.          MACRO:  None      Signed by: Chapincito Burnham 8/1/2024 10:01 PM  Dictation workstation:   [id number]   XR chest 1 view [[id number]] Collected: 08/01/24 1104   Order Status: Completed Updated: 08/01/24 1104   Narrative:     Interpreted By:  Alea Jessica,  STUDY:  XR CHEST 1 VIEW;  8/1/2024 10:42 am      INDICATION:  Signs/Symptoms:Chest Pain.      COMPARISON:  None.      ACCESSION  NUMBER(S):  [id number]      ORDERING CLINICIAN:  DAVID CUEVAS      FINDINGS:  CARDIOMEDIASTINAL SILHOUETTE:  Cardiomediastinal silhouette is normal in size and configuration.          LUNGS:  Lungs are clear.      ABDOMEN:  No remarkable upper abdominal findings.          BONES:  No acute osseous changes.       Impression:     No acute cardiopulmonary process.      MACRO:  None      Signed by: Alea Jesisca 8/1/2024 11:03 AM  Dictation workstation:   [id number]             Medical Problems       Problem List       * (Principal) BETHANY (acute kidney injury) (CMS-HCC)    Primary hypertension    Obesity due to excess calories    Hypotension    Leukocytosis               Above medical problems may be reflective of historical medical problems that may have resolved and may not related to acute clinical condition/medical problems.    Clinical impression/plan:    Assessment/Plan  Principal Problem:    BETHANY (acute kidney injury) (CMS-HCC)  Active Problems:    Primary hypertension    Obesity due to excess calories    Hypotension    Leukocytosis  Hypokalemia.     Plan:  Admit to gen med     Hypotension/BETHANY:  Cr 2.09 on admission, baseline WNL.  Gentle hydration  Will hold home lisiopril/hydrochlorothiazide and continue Coreg BID and amlodipine 10 mg daily  BETHANY likely due to overdiuresis/lowering of bp too quickly.  Regimen will need to be adjusted before discharge.     Diet: Regular  DVT Prophylaxis: Lovenox SQ  Code Status: Full code       Disposition/additional care plan/interventions: 8/6/2024      Suspect acute kidney injury and hypovolemic state/hypoperfusion.      Continue fluid hydration    Hold nephrotoxins    Consultation to nephrology, renal imaging/sizing of kidneys may be of benefit.    Outpatient follow-up with family physician nephrology to continue to monitor renal function and recovery.    Continue Norvasc and Coreg    Continue hold hydrochlorothiazide and lisinopril.    Monitor volume status closely giving  fluid hydration    Trend renal indices.    Hypokalemia...............supplement potassium    The patient was informed of differential diagnosis , work up , plan of care and possible sequelae of clinical disposition.Patient in agreement with plan of care. Further recommendations forthcoming in accordance with patient's clinical disposition and response to care.    Discharge planning:Anticipate discharge in next 24 hours    Care time: < 55 mins           Dictation performed with assistance of voice recognition device therefore transcription errors are possible.

## 2024-08-06 NOTE — PROGRESS NOTES
Bernadette Meeks is a 29 y.o. female admitted for BETHANY (acute kidney injury) (CMS-[facility]). Pharmacy reviewed the patient's [id number] medications and allergies for accuracy.    The list below reflects the PTA list prior to pharmacy medication history. A summary a changes to the PTA medication list has been listed below. Please review each medication in order reconciliation for additional clarification and justification.    Source of information: T2P    Medications added:    Medications modified:    Medications to be removed:  LISINOPRIL-hydrochlorothiazide 20-25MG       Medications of concern:      Prior to Admission Medications   Prescriptions Last Dose Informant Patient Reported? Taking?   amLODIPine (Norvasc) 10 mg tablet   No No   Sig: Take 1 tablet (10 mg) by mouth once daily.   carvedilol (Coreg) 12.5 mg tablet   No No   Sig: Take 1 tablet (12.5 mg) by mouth 2 times a day.   lisinopriL-hydrochlorothiazide 20-25 mg tablet   No No   Sig: Take 1 tablet by mouth once daily.      Facility-Administered Medications: None       KAY PEREZ

## 2024-08-06 NOTE — ED PROVIDER NOTES
HPI   Chief Complaint   Patient presents with   • Dizziness   • Shortness of Breath     Pt states started 3 new BP meds and feels dizzy like she can not get a breath and head feels heavy  states room is spinning  this morning  also c/o chest tightness released from here 2 days ago for hypertension        29-year-old female with past medical history of hypertension presents ED with concerns for lightheadedness.  Patient says she was discharged in the hospital 2 days ago where she was admitted for hypertensive urgency.  She is put on 3 new medications and today after she took her medications became lightheaded all day.  Says her blood pressures in the low 100s all day.  Upon arrival she is asymptomatic now.  However her blood pressure is improved.  Denies any headache.  No vision changes, weakness or numbness to extremities.  No chest pain or shortness of breath.  No leg swelling.              Patient History   Past Medical History:   Diagnosis Date   • Hypertension      History reviewed. No pertinent surgical history.  No family history on file.  Social History     Tobacco Use   • Smoking status: Never   • Smokeless tobacco: Never   Vaping Use   • Vaping status: Former   • Quit date: 8/1/2024   Substance Use Topics   • Alcohol use: Yes     Comment: rare   • Drug use: Not Currently     Types: Marijuana       Physical Exam   ED Triage Vitals [08/05/24 1958]   Temperature Heart Rate Respirations BP   36.4 °C (97.5 °F) 86 20 112/76      Pulse Ox Temp Source Heart Rate Source Patient Position   99 % Tympanic Monitor Sitting      BP Location FiO2 (%)     Left arm --       Physical Exam  Vitals and nursing note reviewed.   Constitutional:       General: She is not in acute distress.     Appearance: She is well-developed.   HENT:      Head: Normocephalic and atraumatic.   Eyes:      Conjunctiva/sclera: Conjunctivae normal.   Cardiovascular:      Rate and Rhythm: Normal rate and regular rhythm.      Heart sounds: No murmur  heard.  Pulmonary:      Effort: Pulmonary effort is normal. No respiratory distress.      Breath sounds: Normal breath sounds.   Abdominal:      Palpations: Abdomen is soft.      Tenderness: There is no abdominal tenderness.   Musculoskeletal:         General: No swelling.      Cervical back: Neck supple.   Skin:     General: Skin is warm and dry.      Capillary Refill: Capillary refill takes less than 2 seconds.   Neurological:      General: No focal deficit present.      Mental Status: She is alert and oriented to person, place, and time.   Psychiatric:         Mood and Affect: Mood normal.           ED Course & MDM   Diagnoses as of 08/06/24 0056   BETHANY (acute kidney injury) (CMS-[facility])   Lightheadedness                       [place] Coma Scale Score: 15                        Medical Decision Making  HISTORIAN:  Patient    CHART REVIEW:  Patient was just admitted to this hospital and discharged 2 days ago where she was admitted for hypertensive urgency.  Initial systolics in the 240s.    PT SUMMARY:  29-year-old female presents to ED with concerns for episode of lightheadedness.  Vital signs stable.    DDX:  Orthostasis, vagal reaction, arrhythmia, hypoglycemia, ACS, PE, valvular abnormality, dehydration, electrolyte abnormality, hypoglycemia      PLAN:  Will obtain CBC, BMP, EKG, troponin, chest x-ray, pregnancy to    DISPO/RE-EVAL:  EKG showed sinus rhythm with no new ischemic changes.  Troponin negative.  Labs otherwise significant for new onset BETHANY with a creatinine of 2.09.  This is significantly elevated from her creatinine 8 days ago.  I suspect this is likely due to her having her blood pressure dropping too fast from her systolics being 240s a few days ago.  Patient does not have any primary care physician therefore has no way to get repeat renal function testing as outpatient.  Therefore, will admit patient to observation for repeat kidney function testing.          Procedure  Procedures     Mario Dickson,    08/07/24 0040

## 2024-08-06 NOTE — PROGRESS NOTES
08/06/24 1600   Discharge Planning   Living Arrangements Spouse/significant other   Support Systems Spouse/significant other   Assistance Needed none   Type of Residence Private residence   Home or Post Acute Services None   Expected Discharge Disposition Home   Does the patient need discharge transport arranged? No     Patient is from home with her  and family. She is independent with ambulation and ADL's. Patient was recently admitted with HTN and discharged home. Now readmitted with BETHANY. Patient is not established with a PCP; a referral to primary care should have been made at the time of patient's prior discharge. No PCP appointment noted in EMR. Discussed with Dr Keith; he is aware that he will need to make a referral to primary care at the time of discharge.

## 2024-08-06 NOTE — CONSULTS
Reason For Consult    BETHANY       History Of Present Illness  Bernadette Meeks is a 29 y.o. female presenting with past medical history hypertension, and obesity BMI 49.71.  Presents to the ED with hypotension, patient was recently admitted for hypertension urgency with a blood pressure 212/147. At that time her home medications were labetalol 200 mg TID and Procardia 120 mg daily. She was switched to Coreg 12.5 mg daily, Lisinopril 20 mg/hydrochlorothiazide 25 mg, and Norvasc 10 mg daily and discharged on this. Unfortunately earlier yesterday she became hypotensive with SBP around 107. She was having lightheadedness, dizziness, nausea and some chest pain. Her symptoms have improved now that her blood pressure has increased. No syncope, falls, headache, numbness, abdominal pain or other symptoms. She feels her blood pressure was dropped too low as a result of her medication change. She also admits that she has not been eating or drinking very much the last few days.     Nephrology was asked to see the patient for acute kidney injury serum creatinine currently 1.74 improving from 2.09.  Has a baseline of 0.5-0.7  Was previously taking lisinopril/hydrochlorothiazide  Blood pressure fluctuation was as high as systolic of 210 and now 112  No CT with contrast  Past Medical History  She has a past medical history of Hypertension.    Surgical History  She has no past surgical history on file.     Social History  She reports that she has never smoked. She has never used smokeless tobacco. She reports current alcohol use. She reports that she does not currently use drugs after having used the following drugs: Marijuana.    Family History  No family history on file.  No family history of end-stage renal disease  Allergies  Penicillin, Penicillins, and Amoxicillin    Review of Systems  .Review of Systems   Constitutional:  Negative for activity change and appetite change.   Eyes:  Negative for visual disturbance.   Cardiovascular:   "Negative for chest pain, palpitations and leg swelling.   Gastrointestinal:  Negative for abdominal distention, abdominal pain and constipation.   Genitourinary:  Negative for difficulty urinating and frequency.   Neurological:  Positive for light-headedness. Negative for tremors, seizures, weakness and numbness.         Physical Exam  .Physical Exam  Constitutional:       Appearance: Normal appearance.   HENT:      Mouth/Throat:      Mouth: Mucous membranes are moist.   Eyes:      Extraocular Movements: Extraocular movements intact.      Pupils: Pupils are equal, round, and reactive to light.   Cardiovascular:      Rate and Rhythm: Normal rate and regular rhythm.   Pulmonary:      Effort: Pulmonary effort is normal.      Breath sounds: Normal breath sounds.   Abdominal:      General: Abdomen is flat. Bowel sounds are normal.      Palpations: Abdomen is soft.   Musculoskeletal:      Right lower leg: No edema.      Left lower leg: No edema.   Skin:     General: Skin is warm and dry.      Capillary Refill: Capillary refill takes 2 to 3 seconds.   Neurological:      Mental Status: She is alert and oriented to person, place, and time. Mental status is at baseline.   Psychiatric:         Mood and Affect: Mood normal.                I&O 24HR    Intake/Output Summary (Last 24 hours) at 8/6/2024 1424  Last data filed at 8/6/2024 1305  Gross per 24 hour   Intake 1610 ml   Output --   Net 1610 ml       Vitals 24HR  Heart Rate:  [65-86]   Temp:  [36.1 °C (97 °F)-36.4 °C (97.5 °F)]   Resp:  [16-22]   BP: (112-175)/(64-96)   Height:  [167.6 cm (5' 6\")]   Weight:  [140 kg (308 lb)]   SpO2:  [94 %-100 %]   .  Vitals:    08/06/24 0515 08/06/24 0651 08/06/24 0856 08/06/24 1305   BP: 117/67 123/64 137/85 142/82   BP Location:   Left arm Left arm   Patient Position:   Lying Lying   Pulse: 74 65 69 77   Resp: 16 16 16 17   Temp:   36.1 °C (97 °F) 36.1 °C (97 °F)   TempSrc:   Temporal Temporal   SpO2: 94% 94% 96% 98%   Weight:     "   Height:            Current Facility-Administered Medications:     amLODIPine (Norvasc) tablet 10 mg, 10 mg, oral, Daily, Leonid Strauss DO, 10 mg at 08/06/24 0956    bisacodyl (Dulcolax) EC tablet 10 mg, 10 mg, oral, Daily PRN, Leonid Strauss DO    bisacodyl (Dulcolax) suppository 10 mg, 10 mg, rectal, Daily PRN, Leonid Strauss DO    carvedilol (Coreg) tablet 12.5 mg, 12.5 mg, oral, BID, Leonid Strauss DO, 12.5 mg at 08/06/24 0339    enoxaparin (Lovenox) syringe 40 mg, 40 mg, subcutaneous, q12h PJ, Leonid Strauss DO, 40 mg at 08/06/24 0339    guaiFENesin (Mucinex) 12 hr tablet 600 mg, 600 mg, oral, q12h PRN, Leonid Strauss DO    [Held by provider] lisinopril 20 mg, hydroCHLOROthiazide 25 mg for Zestoretic/Prinizide, , oral, Daily, Leonid Strauss DO    melatonin tablet 3 mg, 3 mg, oral, Nightly PRN, Leonid Strauss DO    ondansetron (Zofran) tablet 4 mg, 4 mg, oral, q8h PRN **OR** ondansetron (Zofran) injection 4 mg, 4 mg, intravenous, q8h PRN, Leonid Strauss DO    sodium chloride 0.9% infusion, 100 mL/hr, intravenous, Continuous, Leonid Strauss DO, Last Rate: 100 mL/hr at 08/06/24 1428, 100 mL/hr at 08/06/24 1428     Relevant Results       Assessment/Plan   Acute kidney injury/ ATN hypoperfusion in the setting of blood pressure fluctuations (N17,9)  Hypokalemia (E87.6)  Acute metabolic acidosis (E87,21)  Obesity BMI 49.71(E66.01)  Leukocytosis  Hypotension  Recommendation    Serum creatinine baseline 0.5-0.7  Hold lisinopril/hydrochlorothiazide, will need to monitor blood pressure  Avoid abrupt fluctuations in blood pressures  Continue IV fluids  Will order urine studies and UA  Will order renal ultrasound to establish kidney size and rule out nephrolithiasis  Renal panel for the morning  Will replace potassium, replace as needed  Magnesium level ordered  Metabolic acidosis will improve as acute kidney injury resolves  Strict I and O documentation  Hold all nephrotoxic agents and avoid contrast  We will continue  to follow along darek oswald  Principal Problem:    BETHANY (acute kidney injury) (CMS-[facility])  Active Problems:    Primary hypertension    Obesity due to excess calories    Hypotension    Leukocytosis          .Thank you for the consult and the opportunity to participate inn the care of this patient. Please do not hesitate to contact us with any questions or concern  YODIT Jessica, CNP  [facility]  [phone]   YODIT Marques-CNP

## 2024-08-06 NOTE — H&P
[facility] - GENERAL MEDICINE HISTORY AND PHYSICAL    History Obtained From: Pt    History Of Present Illness:  Bernadette Meeks is a 29 y.o. female with PMHx s/f HTN, obesity presenting with hypotension. Pt was just discharged from this hospital three days after after being treated for hypertensive urgency with blood pressures initially 212/147 on arrival. At that time her home medications were labetalol 200 mg TID and Procardia 120 mg daily. She was switched to Coreg 12.5 mg daily, Lisinopril 20 mg/hydrochlorothiazide 25 mg, and Norvasc 10 mg daily and discharged on this. Unfortunately earlier yesterday she became hypotensive with SBP around 107. She was having lightheadedness, dizziness, nausea and some chest pain. Her symptoms have improved now that her blood pressure has increased. No syncope, falls, headache, numbness, abdominal pain or other symptoms. She feels her blood pressure was dropped too low as a result of her medication change. She also admits that she has not been eating or drinking very much the last few days.    ED Course (Summary):   Vitals on presentation: 97.5 F, 86 bpm, 20 rr, 112/76, 99% on RA  Labs: CMP Na 134, BUN 34, Cr 2.09  Trop 8 --> 7  CBC WBC 12.1, Hg 14.8, Plt 394  Imaging: CXR - Cardiomegaly without evidence of acute disease in the chest.   EKG - Sinus rhythm at 83 bpm, incomplete LBBB  Interventions: Admission for management of hypotension    ED Course (From Provider):  Diagnoses as of 08/06/24 0216   BETHANY (acute kidney injury) (CMS-[facility])   Lightheadedness     Relevant Results  Results for orders placed or performed during the hospital encounter of 08/05/24 (from the past 24 hour(s))   CBC with Differential   Result Value Ref Range    WBC 12.1 (H) 4.4 - 11.3 x10*3/uL    nRBC 0.0 0.0 - 0.0 /100 WBCs    RBC 5.67 (H) 4.00 - 5.20 x10*6/uL    Hemoglobin 14.8 12.0 - 16.0 g/dL    Hematocrit 44.9 36.0 - 46.0 %    MCV 79 (L) 80 - 100 fL    MCH 26.1 26.0 - 34.0 pg    MCHC 33.0 32.0  - 36.0 g/dL    RDW 15.5 (H) 11.5 - 14.5 %    Platelets 394 150 - 450 x10*3/uL    Neutrophils % 62.7 40.0 - 80.0 %    Immature Granulocytes %, Automated 0.5 0.0 - 0.9 %    Lymphocytes % 27.5 13.0 - 44.0 %    Monocytes % 7.4 2.0 - 10.0 %    Eosinophils % 1.6 0.0 - 6.0 %    Basophils % 0.3 0.0 - 2.0 %    Neutrophils Absolute 7.58 1.20 - 7.70 x10*3/uL    Immature Granulocytes Absolute, Automated 0.06 0.00 - 0.70 x10*3/uL    Lymphocytes Absolute 3.33 1.20 - 4.80 x10*3/uL    Monocytes Absolute 0.90 0.10 - 1.00 x10*3/uL    Eosinophils Absolute 0.19 0.00 - 0.70 x10*3/uL    Basophils Absolute 0.04 0.00 - 0.10 x10*3/uL   Comprehensive Metabolic Panel   Result Value Ref Range    Glucose 87 74 - 99 mg/dL    Sodium 134 (L) 136 - 145 mmol/L    Potassium 3.6 3.5 - 5.3 mmol/L    Chloride 101 98 - 107 mmol/L    Bicarbonate 23 21 - 32 mmol/L    Anion Gap 14 10 - 20 mmol/L    Urea Nitrogen 34 (H) 6 - 23 mg/dL    Creatinine 2.09 (H) 0.50 - 1.05 mg/dL    eGFR 32 (L) >60 mL/min/1.73m*2    Calcium 9.7 8.6 - 10.3 mg/dL    Albumin 4.4 3.4 - 5.0 g/dL    Alkaline Phosphatase 66 33 - 110 U/L    Total Protein 7.7 6.4 - 8.2 g/dL    AST 16 9 - 39 U/L    Bilirubin, Total 0.5 0.0 - 1.2 mg/dL    ALT 33 7 - 45 U/L   Troponin I, High Sensitivity, Initial   Result Value Ref Range    Troponin I, High Sensitivity 8 0 - 13 ng/L   Troponin, High Sensitivity, 1 Hour   Result Value Ref Range    Troponin I, High Sensitivity 7 0 - 13 ng/L      XR chest 1 view    Result Date: 8/5/2024  Interpreted By:  Leonid Hanna, STUDY: XR CHEST 1 VIEW;  8/5/2024 10:29 pm   INDICATION: Signs/Symptoms:Chest Pain.   COMPARISON: 08/01/2024   ACCESSION NUMBER(S): [id number]   ORDERING CLINICIAN: RAUDEL DRUMMOND   FINDINGS:     Cardiomegaly, particular due to left ventricular enlargement. The pulmonary vasculature is within normal limits. No consolidation, pleural effusion or pneumothorax.         Cardiomegaly without evidence of acute disease in the chest.     MACRO:  None.   Signed by: Leonid Hanna 8/5/2024 11:00 PM Dictation workstation:   [id number]    Scheduled medications:  amLODIPine, 10 mg, oral, Daily  carvedilol, 12.5 mg, oral, BID  enoxaparin, 40 mg, subcutaneous, q12h PJ  [Held by provider] lisinopril 20 mg, hydroCHLOROthiazide 25 mg for Zestoretic/Prinizide, , oral, Daily  sodium chloride, 500 mL, intravenous, Once      Continuous medications:  sodium chloride 0.9%, 100 mL/hr      PRN medications:  PRN medications: bisacodyl, bisacodyl, guaiFENesin, melatonin, ondansetron **OR** ondansetron     Past Medical History  She has a past medical history of Hypertension.    Surgical History  She has no past surgical history on file.     Social History  She reports that she has never smoked. She has never used smokeless tobacco. She reports current alcohol use. She reports that she does not currently use drugs after having used the following drugs: Marijuana.    Family History  No family history on file.    Allergies  Penicillin, Penicillins, and Amoxicillin    Code Status  Full Code     Review of Systems   Constitutional:  Negative for activity change, appetite change, chills, diaphoresis, fatigue and fever.   HENT:  Negative for congestion, ear pain, rhinorrhea, sinus pain and sore throat.    Respiratory:  Negative for apnea, cough, chest tightness, shortness of breath, wheezing and stridor.    Cardiovascular:  Positive for chest pain. Negative for palpitations and leg swelling.   Gastrointestinal:  Positive for nausea. Negative for abdominal distention, abdominal pain, constipation, diarrhea and vomiting.   Genitourinary:  Negative for difficulty urinating, dysuria, flank pain, frequency, hematuria and urgency.   Musculoskeletal:  Negative for arthralgias, back pain, gait problem, joint swelling and myalgias.   Skin:  Negative for color change, pallor, rash and wound.   Neurological:  Positive for dizziness and light-headedness. Negative for tremors, seizures,  syncope, facial asymmetry, speech difficulty, weakness, numbness and headaches.   Psychiatric/Behavioral:  Negative for agitation, behavioral problems, confusion and decreased concentration. The patient is not nervous/anxious.    All other systems reviewed and are negative.      Last Recorded Vitals  /86   Pulse 69   Temp 36.4 °C (97.5 °F) (Tympanic)   Resp 18   Wt 140 kg (308 lb)   SpO2 97%      Physical Exam:  Vital signs and nursing notes reviewed.   Constitutional: Pleasant and cooperative. Laying in bed in no acute distress. Conversant.   Skin: Warm and dry; no obvious lesions, rashes, pallor, or jaundice. Good turgor.   Eyes: EOMI. Anicteric sclera.   ENT: Mucous membranes moist; no obvious injury or deformity appreciated.   Head and Neck: Normocephalic, atraumatic. ROM preserved. Trachea midline. No appreciable JVD.   Respiratory: Nonlabored on RA. Lungs clear to auscultation bilaterally without obvious adventitious sounds. Chest rise is equal.  Cardiovascular: RRR. No gross murmur, gallop, or rub. Extremities are warm and well-perfused with good capillary refill (< 3 seconds). No chest wall tenderness.   GI: Abdomen soft, nontender, nondistended. No obvious organomegaly appreciated. Bowel sounds are present and normoactive.  : No CVA tenderness.   MSK: No gross abnormalities appreciated. No limitations to AROM/PROM appreciated.   Extremities: No cyanosis, edema, or clubbing evident. Neurovascularly intact.   Neuro: A&Ox3. CN 2-12 grossly intact. Able to respond to questions appropriately and clearly. No acute focal neurologic deficits appreciated.  Psych: Appropriate mood and behavior.    Assessment/Plan   Principal Problem:    BETHANY (acute kidney injury) (CMS-HCC)  Active Problems:    Primary hypertension    Obesity due to excess calories    Hypotension    Leukocytosis    Plan:  Admit to gen med    Hypotension/BETHANY:  Cr 2.09 on admission, baseline WNL.  Gentle hydration  Will hold home  lisiopril/hydrochlorothiazide and continue Coreg BID and amlodipine 10 mg daily  BETHANY likely due to overdiuresis/lowering of bp too quickly.  Regimen will need to be adjusted before discharge.    Diet: Regular  DVT Prophylaxis: Lovenox SQ  Code Status: Full code     DO Susan Liz dictation software was used to dictate this note and thus there may be minor errors in translation/transcription including garbled speech or misspellings. Please contact for clarification if needed.

## 2024-08-07 ENCOUNTER — APPOINTMENT (OUTPATIENT)
Dept: VASCULAR MEDICINE | Facility: HOSPITAL | Age: 29
End: 2024-08-07
Payer: COMMERCIAL

## 2024-08-07 ENCOUNTER — APPOINTMENT (OUTPATIENT)
Dept: RADIOLOGY | Facility: HOSPITAL | Age: 29
End: 2024-08-07
Payer: COMMERCIAL

## 2024-08-07 VITALS
WEIGHT: 293 LBS | HEIGHT: 66 IN | BODY MASS INDEX: 47.09 KG/M2 | HEART RATE: 62 BPM | OXYGEN SATURATION: 97 % | SYSTOLIC BLOOD PRESSURE: 129 MMHG | TEMPERATURE: 97.4 F | DIASTOLIC BLOOD PRESSURE: 81 MMHG | RESPIRATION RATE: 16 BRPM

## 2024-08-07 LAB
ALBUMIN SERPL BCP-MCNC: 3.7 G/DL (ref 3.4–5)
ANION GAP SERPL CALC-SCNC: 10 MMOL/L (ref 10–20)
BUN SERPL-MCNC: 26 MG/DL (ref 6–23)
CALCIUM SERPL-MCNC: 8.6 MG/DL (ref 8.6–10.3)
CHLORIDE SERPL-SCNC: 104 MMOL/L (ref 98–107)
CO2 SERPL-SCNC: 25 MMOL/L (ref 21–32)
CREAT SERPL-MCNC: 0.92 MG/DL (ref 0.5–1.05)
EGFRCR SERPLBLD CKD-EPI 2021: 86 ML/MIN/1.73M*2
GLUCOSE SERPL-MCNC: 98 MG/DL (ref 74–99)
PHOSPHATE SERPL-MCNC: 3.1 MG/DL (ref 2.5–4.9)
POTASSIUM SERPL-SCNC: 3.5 MMOL/L (ref 3.5–5.3)
SODIUM SERPL-SCNC: 135 MMOL/L (ref 136–145)

## 2024-08-07 PROCEDURE — 76770 US EXAM ABDO BACK WALL COMP: CPT | Performed by: RADIOLOGY

## 2024-08-07 PROCEDURE — 76770 US EXAM ABDO BACK WALL COMP: CPT

## 2024-08-07 PROCEDURE — 80069 RENAL FUNCTION PANEL: CPT | Performed by: REGISTERED NURSE

## 2024-08-07 PROCEDURE — 2500000004 HC RX 250 GENERAL PHARMACY W/ HCPCS (ALT 636 FOR OP/ED): Performed by: STUDENT IN AN ORGANIZED HEALTH CARE EDUCATION/TRAINING PROGRAM

## 2024-08-07 PROCEDURE — 82088 ASSAY OF ALDOSTERONE: CPT | Performed by: INTERNAL MEDICINE

## 2024-08-07 PROCEDURE — 99239 HOSP IP/OBS DSCHRG MGMT >30: CPT | Performed by: HOSPITALIST

## 2024-08-07 PROCEDURE — 36415 COLL VENOUS BLD VENIPUNCTURE: CPT | Performed by: REGISTERED NURSE

## 2024-08-07 PROCEDURE — 2500000001 HC RX 250 WO HCPCS SELF ADMINISTERED DRUGS (ALT 637 FOR MEDICARE OP): Performed by: STUDENT IN AN ORGANIZED HEALTH CARE EDUCATION/TRAINING PROGRAM

## 2024-08-07 PROCEDURE — 93975 VASCULAR STUDY: CPT

## 2024-08-07 PROCEDURE — 93975 VASCULAR STUDY: CPT | Performed by: SURGERY

## 2024-08-07 PROCEDURE — 2500000004 HC RX 250 GENERAL PHARMACY W/ HCPCS (ALT 636 FOR OP/ED): Performed by: HOSPITALIST

## 2024-08-07 ASSESSMENT — COGNITIVE AND FUNCTIONAL STATUS - GENERAL
MOBILITY SCORE: 24
DAILY ACTIVITIY SCORE: 24

## 2024-08-07 ASSESSMENT — PAIN SCALES - GENERAL: PAINLEVEL_OUTOF10: 0 - NO PAIN

## 2024-08-07 NOTE — DISCHARGE SUMMARY
Discharge Summary    Bernadette Meeks    [id number]     8/5/2024  9:47 PM ,admit date    8/7/2024, discharge date       .      Discharge Diagnosis:      1.  Acute kidney injury    2.  Hypotension, resolved.    3.  Essential hypertension    4.  Obesity, safe weight loss recommend    5.  Hypokalemia, resolved post supplementation    Problem List Items Addressed This Visit             ICD-10-CM       Genitourinary and Reproductive    * (Principal) BETHANY (acute kidney injury) (CMS-[facility]) - Primary N17.9    Relevant Orders    Vascular US Renal Artery Duplex Complete (Completed)     Other Visit Diagnoses         Codes    Lightheadedness     R42    Hypertensive urgency     I16.0    Relevant Orders    Vascular US Renal Artery Duplex Complete (Completed)    Essential (primary) hypertension     I10               Hospital Course:      32-year-old  female with notable history of hypertension recently regulated to diuretic as well as ACE inhibitor therapy as a part of her blood pressure regiment presented with acute kidney injury.  She responded well to IV fluids with an stoppage of hydrochlorothiazide and lisinopril.  Her blood pressures also improved as well high clinical suspicion of hypoperfusion related to hypotension concomitant use of ACE inhibitor and diuretic.  Blood pressure has remained within fairly normal range on Coreg and Norvasc will continue Coreg and Norvasc on discharge.  Renal ultrasound was ordered by our nephrology service will recommend follow-up with nephrology concerning results of.  Patient requested discharge home today patient in agreement with discharge.   The patient's overall hospital course appeared uneventful with patient showing marked improvement in renal function/indices.Creatinine on presentation 2.09, with downward trending to 1.74 and today at 0.92    Patient appeared clinically stable and fit for discharge today.  Patient should seek medical attention immediately if condition should  worsen, new symptoms develop , no further improvement or recurrence of presenting symptomatology, patient warned.    Progress note on the date of  discharge.     Bernadette Meeks [id number]   Service: Internal Medicine / Hospitalist Date of service: 8/7/2024                                  Full Code                           Subjective     History Of Present Illness:  Bernadette Meeks is a 29 y.o. female with PMHx s/f HTN, obesity presenting with hypotension. Pt was just discharged from this hospital three days after after being treated for hypertensive urgency with blood pressures initially 212/147 on arrival. At that time her home medications were labetalol 200 mg TID and Procardia 120 mg daily. She was switched to Coreg 12.5 mg daily, Lisinopril 20 mg/hydrochlorothiazide 25 mg, and Norvasc 10 mg daily and discharged on this. Unfortunately earlier yesterday she became hypotensive with SBP around 107. She was having lightheadedness, dizziness, nausea and some chest pain. Her symptoms have improved now that her blood pressure has increased. No syncope, falls, headache, numbness, abdominal pain or other symptoms. She feels her blood pressure was dropped too low as a result of her medication change. She also admits that she has not been eating or drinking very much the last few days.     ED Course (Summary):   Vitals on presentation: 97.5 F, 86 bpm, 20 rr, 112/76, 99% on RA  Labs: CMP Na 134, BUN 34, Cr 2.09  Trop 8 --> 7  CBC WBC 12.1, Hg 14.8, Plt 394  Imaging: CXR - Cardiomegaly without evidence of acute disease in the chest.   EKG - Sinus rhythm at 83 bpm, incomplete LBBB  Interventions: Admission for management of hypotension        8/6...........Patient reported that she was feeling better today.  She reported lightheadedness and dizziness yesterday.  Denies prior history of kidney disease or NSAID use.  No reported: Palpitations, dyspnea, orthopnea, fevers or chills.     8/7...................Patient had no complaints  today reported that she felt really good with her current blood pressure no reported lightheadedness, chest pains, palpitations, nausea or vomiting.        Review of Systems:   Review of system otherwise negative if not aforementioned above in subjective.     Objective        Physical Exam      Constitutional:       Appearance: Patient appeared in no acute cardiopulmonary distress.     Comments: Patient alert and oriented to person place time and situation.  HEENT:      Head: Normocephalic and atraumatic.Trachea midline      Nose:No observed congestion or rhinorrhea.     Mouth/Throat: Mucous membranes Moist, Trachea appeared  midline.  Eyes:      Extraocular Movements: Extraocular movements intact.      Pupils: Pupils are equal, round, and reactive to light.      Comments: No scleral icterus or conjunctival injection appreciated.   Cardiovascular:      Rate and Rhythm: Normal rate and regular rhythm. No clicks rubs or gallops, normal S1 and S2.No peripheral stigmata of endocarditis appreciated.     Pulmonary:      Lungs appeared clear to auscultation, no adventitious sound appreciated.  Abdominal:      General: Abdomen soft, obese, nontender, active bowel sounds, no involuntary guarding or rebound tenderness appreciated.     Comments: None   Musculoskeletal:       Patient appeared to have full active range of motion for upper and lower extremities, no acute apparent joint deformity appreciated on examination.   No pitting edema or cyanosis appreciated.       Lymphadenopathy:      No appreciable palpable lymphadenopathy  Skin:     General: Skin is warm.      Coloration:  No jaundice     Findings: No abnormal appearing skin rashes or lesions that appeared acute noted on unclothed area of the skin..   Neurological:      General: No focal sensory or motor deficits appreciated, no meningeal signs or dysmetria noted.      Cranial Nerves: Cranial nerves II to XII appearing grossly intact.      Genitals:  Deferred  Psychiatric:         The patient appears to be displaying normal mood and affect at the time of evaluation.     Labs:               Lab Results   Component Value Date     GLUCOSE 102 (H) 08/06/2024     CALCIUM 9.0 08/06/2024      (L) 08/06/2024     K 3.2 (L) 08/06/2024     CO2 21 08/06/2024      08/06/2024     BUN 33 (H) 08/06/2024     CREATININE 1.74 (H) 08/06/2024                Lab Results   Component Value Date     WBC 11.5 (H) 08/06/2024     HGB 13.3 08/06/2024     HCT 39.1 08/06/2024     MCV 78 (L) 08/06/2024      08/06/2024            Lab Results   Component Value Date     GLUCOSE 98 08/07/2024     CALCIUM 8.6 08/07/2024      (L) 08/07/2024     K 3.5 08/07/2024     CO2 25 08/07/2024      08/07/2024     BUN 26 (H) 08/07/2024     CREATININE 0.92 08/07/2024      [unfilled]   [unfilled]   No results found for the last 90 days.                  X-rays/ Images     [unfilled]   Radiology Results (last 21 days)     Procedure Component Value Units Date/Time   XR chest 1 view [[id number]] Collected: 08/05/24 2301   Order Status: Completed Updated: 08/05/24 2301   Narrative:     Interpreted By:  Leonid Hanna,  STUDY:  XR CHEST 1 VIEW;  8/5/2024 10:29 pm      INDICATION:  Signs/Symptoms:Chest Pain.      COMPARISON:  08/01/2024      ACCESSION NUMBER(S):  [id number]      ORDERING CLINICIAN:  RAUDEL DRUMMOND      FINDINGS:          Cardiomegaly, particular due to left ventricular enlargement. The  pulmonary vasculature is within normal limits. No consolidation,  pleural effusion or pneumothorax.           Impression:     Cardiomegaly without evidence of acute disease in the chest.          MACRO:  None.      Signed by: Leonid Hanna 8/5/2024 11:00 PM  Dictation workstation:   [id number]   CT head wo IV contrast [[id number]] Collected: 08/03/24 0001   Order Status: Completed Updated: 08/03/24 0001   Narrative:     Interpreted By:  Francisco,  Preston,  STUDY:  CT HEAD WO IV CONTRAST;  8/2/2024 10:10 pm      INDICATION:  Signs/Symptoms:Worsening Headache, hypertensive emergency on  presentation.      COMPARISON:  8/1/2024      ACCESSION NUMBER(S):  [id number]      ORDERING CLINICIAN:  KALPANA CABELLO      TECHNIQUE:  Contiguous axial images of the head were obtained without intravenous  contrast.      FINDINGS:  BRAIN PARENCHYMA:   The gray white matter differentiation is  preserved.  No mass effect or midline shift.      HEMORRHAGE:  No evidence of acute intracranial hemorrhage.  VENTRICLES AND EXTRA-AXIAL SPACES:  The ventricles are within normal  limits in size for brain volume.  No evidence of abnormal extraaxial  fluid collection. EXTRACRANIAL SOFT TISSUES:  Within normal limits.  PARANASAL SINUSES/MASTOIDS:  The visualized paranasal sinuses and  mastoid air cells are clear and well pneumatized. CALVARIUM:  No  evidence of depressed calvarial fracture.      OTHER FINDINGS:  None       Impression:     No evidence of acute intracranial hemorrhage or mass effect.              MACRO:  None      Signed by: Preston Singh 8/2/2024 11:59 PM  Dictation workstation:   [id number]   CT head wo IV contrast [[id number]] Collected: 08/01/24 2202   Order Status: Completed Updated: 08/01/24 2202   Narrative:     Interpreted By:  Chapincito Burnham,  STUDY:  CT HEAD WO IV CONTRAST;  8/1/2024 9:15 pm      INDICATION:  Signs/Symptoms:headache.      COMPARISON:  None.      ACCESSION NUMBER(S):  [id number]      ORDERING CLINICIAN:  HASMUKH CARABALLO      TECHNIQUE:  Axial non-contrast CT images of the head. Coronal and sagittal images  were reconstructed.      FINDINGS:  BRAIN PARENCHYMA: Normal brain volume. Gray-white matter  differentiation is preserved.      VENTRICLES: The ventricles and sulci are within normal limits in size  for brain volume. No midline shift. EXTRA-AXIAL SPACES: No abnormal  extraaxial fluid collection. EXTRACRANIAL SOFT TISSUES: Within normal  limits.  PARANASAL SINUSES: The visualized paranasal sinuses are clear.  MASTOIDS: Mastoid air cells are aerated.  CALVARIUM: No depressed skull fracture. No destructive osseous lesion.  VESSELS: Unremarkable.      OTHER FINDINGS: None.       Impression:     No acute intracranial abnormality.          MACRO:  None      Signed by: Chapincito Burnham 8/1/2024 10:01 PM  Dictation workstation:   [id number]   XR chest 1 view [[id number]] Collected: 08/01/24 1104   Order Status: Completed Updated: 08/01/24 1104   Narrative:     Interpreted By:  Alea Jessica,  STUDY:  XR CHEST 1 VIEW;  8/1/2024 10:42 am      INDICATION:  Signs/Symptoms:Chest Pain.      COMPARISON:  None.      ACCESSION NUMBER(S):  [id number]      ORDERING CLINICIAN:  DAVID CUEVAS      FINDINGS:  CARDIOMEDIASTINAL SILHOUETTE:  Cardiomediastinal silhouette is normal in size and configuration.          LUNGS:  Lungs are clear.      ABDOMEN:  No remarkable upper abdominal findings.          BONES:  No acute osseous changes.       Impression:     No acute cardiopulmonary process.      MACRO:  None      Signed by: Alea Jessica 8/1/2024 11:03 AM  Dictation workstation:   [id number]                  Medical Problems         Problem List         * (Principal) BETHANY (acute kidney injury) (CMS-[facility])     Primary hypertension     Obesity due to excess calories     Hypotension     Leukocytosis                  Above medical problems may be reflective of historical medical problems that may have resolved and may not related to acute clinical condition/medical problems.     Clinical impression/plan:     Assessment/Plan  Principal Problem:    BETHANY (acute kidney injury) (CMS-[facility])  Active Problems:    Primary hypertension    Obesity due to excess calories    Hypotension    Leukocytosis  Hypokalemia.     Plan:  Admit to gen med     Hypotension/BETHANY:  Cr 2.09 on admission, baseline WNL.  Gentle hydration  Will hold home lisiopril/hydrochlorothiazide and continue Coreg BID and amlodipine  10 mg daily  BETHANY likely due to overdiuresis/lowering of bp too quickly.  Regimen will need to be adjusted before discharge.     Diet: Regular  DVT Prophylaxis: Lovenox SQ  Code Status: Full code        Disposition/additional care plan/interventions: 8/6/2024     Suspect acute kidney injury and hypovolemic state/hypoperfusion.     Continue fluid hydration     Hold nephrotoxins     Consultation to nephrology, renal imaging/sizing of kidneys may be of benefit.     Outpatient follow-up with family physician nephrology to continue to monitor renal function and recovery.     Continue Norvasc and Coreg     Continue hold hydrochlorothiazide and lisinopril.     Monitor volume status closely giving fluid hydration     Trend renal indices.     Hypokalemia...............supplement potassium     The patient was informed of differential diagnosis , work up , plan of care and possible sequelae of clinical disposition.Patient in agreement with plan of care. Further recommendations forthcoming in accordance with patient's clinical disposition and response to care.     Discharge planning:/8/7/2024:      Plan discharge to home today outpatient follow-up with nephrology recommended.  Renal ultrasound ordered by nephrology, follow-up outpatient with nephrology concerning results.Patient should seek medical attention immediately if condition should worsen, new symptoms develop , no further improvement or recurrence of presenting symptomatology, patient warned.                    Dictation performed with assistance of voice recognition device therefore transcription errors are possible.             Consultants      Nephrology         Surgeries/Procedures:      None             Your medication list        ASK your doctor about these medications        Instructions Last Dose Given Next Dose Due   amLODIPine 10 mg tablet  Commonly known as: Norvasc      Take 1 tablet (10 mg) by mouth once daily.       carvedilol 12.5 mg tablet  Commonly  known as: Coreg      Take 1 tablet (12.5 mg) by mouth 2 times a day.       lisinopriL-hydrochlorothiazide 20-25 mg tablet      Take 1 tablet by mouth once daily.                   Disposition;      Patient appeared hemodynamically stable and clinically fit for discharge.  Patient in agreement with discharge.  Patient should seek medical attention immediately if condition should worsen, new symptoms develop , no further improvement or recurrence of presenting symptomatology, patient warned.  Follow-up:    Follow -up with family physician within one week. Follow-up with nephrology as directed.      Discharge Time : 31 mins      Patient should seek medical attention immediately if condition should worsen , presenting symptoms/conditions do not resolve or new symptoms should developed,patient warned.     Dictation performed with assistance of voice recognition device therefore transcription errors are possible.

## 2024-08-07 NOTE — CARE PLAN
Problem: Pain - Adult  Goal: Verbalizes/displays adequate comfort level or baseline comfort level  8/6/2024 2011 by Natali Kinsey RN  Outcome: Progressing  8/6/2024 2011 by Natali Kinsey RN  Outcome: Progressing     Problem: Safety - Adult  Goal: Free from fall injury  8/6/2024 2011 by Natali Kinsey RN  Outcome: Progressing  8/6/2024 2011 by Natali Kinsey RN  Outcome: Progressing     Problem: Discharge Planning  Goal: Discharge to home or other facility with appropriate resources  8/6/2024 2011 by Natali Kinsey RN  Outcome: Progressing  8/6/2024 2011 by Natali Kisney RN  Outcome: Progressing     Problem: Chronic Conditions and Co-morbidities  Goal: Patient's chronic conditions and co-morbidity symptoms are monitored and maintained or improved  8/6/2024 2011 by Natali Kinsey RN  Outcome: Progressing  8/6/2024 2011 by Natali Kinsey RN  Outcome: Progressing

## 2024-08-07 NOTE — CARE PLAN
Problem: Pain - Adult  Goal: Verbalizes/displays adequate comfort level or baseline comfort level  Outcome: Progressing     Problem: Safety - Adult  Goal: Free from fall injury  Outcome: Progressing       The clinical goals for the shift include Pt will be free from fall or injury through end of this shift.    No fall or injury this shift. Current safety interventions continue, All needs met this shift. No new skin breakdown this shift.

## 2024-08-07 NOTE — PROGRESS NOTES
Bernadette Meeks [id number]   Service: Internal Medicine / Hospitalist Date of service: 8/7/2024                                  Full Code                           Subjective     History Of Present Illness:  Bernadette Meeks is a 29 y.o. female with PMHx s/f HTN, obesity presenting with hypotension. Pt was just discharged from this hospital three days after after being treated for hypertensive urgency with blood pressures initially 212/147 on arrival. At that time her home medications were labetalol 200 mg TID and Procardia 120 mg daily. She was switched to Coreg 12.5 mg daily, Lisinopril 20 mg/hydrochlorothiazide 25 mg, and Norvasc 10 mg daily and discharged on this. Unfortunately earlier yesterday she became hypotensive with SBP around 107. She was having lightheadedness, dizziness, nausea and some chest pain. Her symptoms have improved now that her blood pressure has increased. No syncope, falls, headache, numbness, abdominal pain or other symptoms. She feels her blood pressure was dropped too low as a result of her medication change. She also admits that she has not been eating or drinking very much the last few days.     ED Course (Summary):   Vitals on presentation: 97.5 F, 86 bpm, 20 rr, 112/76, 99% on RA  Labs: CMP Na 134, BUN 34, Cr 2.09  Trop 8 --> 7  CBC WBC 12.1, Hg 14.8, Plt 394  Imaging: CXR - Cardiomegaly without evidence of acute disease in the chest.   EKG - Sinus rhythm at 83 bpm, incomplete LBBB  Interventions: Admission for management of hypotension        8/6...........Patient reported that she was feeling better today.  She reported lightheadedness and dizziness yesterday.  Denies prior history of kidney disease or NSAID use.  No reported: Palpitations, dyspnea, orthopnea, fevers or chills.    8/7...................Patient had no complaints today reported that she felt really good with her current blood pressure no reported lightheadedness, chest pains, palpitations, nausea or vomiting.         Review of Systems:   Review of system otherwise negative if not aforementioned above in subjective.     Objective       Physical Exam      Constitutional:       Appearance: Patient appeared in no acute cardiopulmonary distress.     Comments: Patient alert and oriented to person place time and situation.  HEENT:      Head: Normocephalic and atraumatic.Trachea midline      Nose:No observed congestion or rhinorrhea.     Mouth/Throat: Mucous membranes Moist, Trachea appeared  midline.  Eyes:      Extraocular Movements: Extraocular movements intact.      Pupils: Pupils are equal, round, and reactive to light.      Comments: No scleral icterus or conjunctival injection appreciated.   Cardiovascular:      Rate and Rhythm: Normal rate and regular rhythm. No clicks rubs or gallops, normal S1 and S2.No peripheral stigmata of endocarditis appreciated.     Pulmonary:      Lungs appeared clear to auscultation, no adventitious sound appreciated.  Abdominal:      General: Abdomen soft, obese, nontender, active bowel sounds, no involuntary guarding or rebound tenderness appreciated.     Comments: None   Musculoskeletal:       Patient appeared to have full active range of motion for upper and lower extremities, no acute apparent joint deformity appreciated on examination.   No pitting edema or cyanosis appreciated.       Lymphadenopathy:      No appreciable palpable lymphadenopathy  Skin:     General: Skin is warm.      Coloration:  No jaundice     Findings: No abnormal appearing skin rashes or lesions that appeared acute noted on unclothed area of the skin..   Neurological:      General: No focal sensory or motor deficits appreciated, no meningeal signs or dysmetria noted.      Cranial Nerves: Cranial nerves II to XII appearing grossly intact.     Genitals:  Deferred  Psychiatric:         The patient appears to be displaying normal mood and affect at the time of evaluation.     Labs:           Lab Results   Component Value Date      GLUCOSE 102 (H) 08/06/2024     CALCIUM 9.0 08/06/2024      (L) 08/06/2024     K 3.2 (L) 08/06/2024     CO2 21 08/06/2024      08/06/2024     BUN 33 (H) 08/06/2024     CREATININE 1.74 (H) 08/06/2024            Lab Results   Component Value Date     WBC 11.5 (H) 08/06/2024     HGB 13.3 08/06/2024     HCT 39.1 08/06/2024     MCV 78 (L) 08/06/2024      08/06/2024      Lab Results   Component Value Date    GLUCOSE 98 08/07/2024    CALCIUM 8.6 08/07/2024     (L) 08/07/2024    K 3.5 08/07/2024    CO2 25 08/07/2024     08/07/2024    BUN 26 (H) 08/07/2024    CREATININE 0.92 08/07/2024      [unfilled]   [unfilled]   No results found for the last 90 days.                  X-rays/ Images     [unfilled]   Radiology Results (last 21 days)     Procedure Component Value Units Date/Time   XR chest 1 view [[id number]] Collected: 08/05/24 2301   Order Status: Completed Updated: 08/05/24 2301   Narrative:     Interpreted By:  Leonid Hanna,  STUDY:  XR CHEST 1 VIEW;  8/5/2024 10:29 pm      INDICATION:  Signs/Symptoms:Chest Pain.      COMPARISON:  08/01/2024      ACCESSION NUMBER(S):  [id number]      ORDERING CLINICIAN:  RAUDEL DRUMMOND      FINDINGS:          Cardiomegaly, particular due to left ventricular enlargement. The  pulmonary vasculature is within normal limits. No consolidation,  pleural effusion or pneumothorax.           Impression:     Cardiomegaly without evidence of acute disease in the chest.          MACRO:  None.      Signed by: Leonid Hanna 8/5/2024 11:00 PM  Dictation workstation:   [id number]   CT head wo IV contrast [[id number]] Collected: 08/03/24 0001   Order Status: Completed Updated: 08/03/24 0001   Narrative:     Interpreted By:  Preston Singh,  STUDY:  CT HEAD WO IV CONTRAST;  8/2/2024 10:10 pm      INDICATION:  Signs/Symptoms:Worsening Headache, hypertensive emergency on  presentation.      COMPARISON:  8/1/2024      ACCESSION  NUMBER(S):  [id number]      ORDERING CLINICIAN:  KALPANA CABELLO      TECHNIQUE:  Contiguous axial images of the head were obtained without intravenous  contrast.      FINDINGS:  BRAIN PARENCHYMA:   The gray white matter differentiation is  preserved.  No mass effect or midline shift.      HEMORRHAGE:  No evidence of acute intracranial hemorrhage.  VENTRICLES AND EXTRA-AXIAL SPACES:  The ventricles are within normal  limits in size for brain volume.  No evidence of abnormal extraaxial  fluid collection. EXTRACRANIAL SOFT TISSUES:  Within normal limits.  PARANASAL SINUSES/MASTOIDS:  The visualized paranasal sinuses and  mastoid air cells are clear and well pneumatized. CALVARIUM:  No  evidence of depressed calvarial fracture.      OTHER FINDINGS:  None       Impression:     No evidence of acute intracranial hemorrhage or mass effect.              MACRO:  None      Signed by: Preston Singh 8/2/2024 11:59 PM  Dictation workstation:   [id number]   CT head wo IV contrast [[id number]] Collected: 08/01/24 2202   Order Status: Completed Updated: 08/01/24 2202   Narrative:     Interpreted By:  Chapincito Burnham,  STUDY:  CT HEAD WO IV CONTRAST;  8/1/2024 9:15 pm      INDICATION:  Signs/Symptoms:headache.      COMPARISON:  None.      ACCESSION NUMBER(S):  [id number]      ORDERING CLINICIAN:  HASMUKH CARABALLO      TECHNIQUE:  Axial non-contrast CT images of the head. Coronal and sagittal images  were reconstructed.      FINDINGS:  BRAIN PARENCHYMA: Normal brain volume. Gray-white matter  differentiation is preserved.      VENTRICLES: The ventricles and sulci are within normal limits in size  for brain volume. No midline shift. EXTRA-AXIAL SPACES: No abnormal  extraaxial fluid collection. EXTRACRANIAL SOFT TISSUES: Within normal  limits. PARANASAL SINUSES: The visualized paranasal sinuses are clear.  MASTOIDS: Mastoid air cells are aerated.  CALVARIUM: No depressed skull fracture. No destructive osseous lesion.  VESSELS:  Unremarkable.      OTHER FINDINGS: None.       Impression:     No acute intracranial abnormality.          MACRO:  None      Signed by: Chapincito Burnham 8/1/2024 10:01 PM  Dictation workstation:   [id number]   XR chest 1 view [[id number]] Collected: 08/01/24 1104   Order Status: Completed Updated: 08/01/24 1104   Narrative:     Interpreted By:  Alea Jessica,  STUDY:  XR CHEST 1 VIEW;  8/1/2024 10:42 am      INDICATION:  Signs/Symptoms:Chest Pain.      COMPARISON:  None.      ACCESSION NUMBER(S):  [id number]      ORDERING CLINICIAN:  DAVID CUEVAS      FINDINGS:  CARDIOMEDIASTINAL SILHOUETTE:  Cardiomediastinal silhouette is normal in size and configuration.          LUNGS:  Lungs are clear.      ABDOMEN:  No remarkable upper abdominal findings.          BONES:  No acute osseous changes.       Impression:     No acute cardiopulmonary process.      MACRO:  None      Signed by: Alea Jessica 8/1/2024 11:03 AM  Dictation workstation:   [id number]                  Medical Problems         Problem List         * (Principal) BETHANY (acute kidney injury) (CMS-[facility])     Primary hypertension     Obesity due to excess calories     Hypotension     Leukocytosis                  Above medical problems may be reflective of historical medical problems that may have resolved and may not related to acute clinical condition/medical problems.     Clinical impression/plan:     Assessment/Plan  Principal Problem:    BETHANY (acute kidney injury) (CMS-[facility])  Active Problems:    Primary hypertension    Obesity due to excess calories    Hypotension    Leukocytosis  Hypokalemia.     Plan:  Admit to gen med     Hypotension/BETHANY:  Cr 2.09 on admission, baseline WNL.  Gentle hydration  Will hold home lisiopril/hydrochlorothiazide and continue Coreg BID and amlodipine 10 mg daily  BETHANY likely due to overdiuresis/lowering of bp too quickly.  Regimen will need to be adjusted before discharge.     Diet: Regular  DVT Prophylaxis: Lovenox SQ  Code Status:  Full code        Disposition/additional care plan/interventions: 8/6/2024        Suspect acute kidney injury and hypovolemic state/hypoperfusion.        Continue fluid hydration     Hold nephrotoxins     Consultation to nephrology, renal imaging/sizing of kidneys may be of benefit.     Outpatient follow-up with family physician nephrology to continue to monitor renal function and recovery.     Continue Norvasc and Coreg     Continue hold hydrochlorothiazide and lisinopril.     Monitor volume status closely giving fluid hydration     Trend renal indices.     Hypokalemia...............supplement potassium     The patient was informed of differential diagnosis , work up , plan of care and possible sequelae of clinical disposition.Patient in agreement with plan of care. Further recommendations forthcoming in accordance with patient's clinical disposition and response to care.     Discharge planning:/8/7/2024:     Plan discharge to home today outpatient follow-up with nephrology recommended.  Renal ultrasound ordered by nephrology, follow-up outpatient with nephrology concerning results.Patient should seek medical attention immediately if condition should worsen, new symptoms develop , no further improvement or recurrence of presenting symptomatology, patient warned.                  Dictation performed with assistance of voice recognition device therefore transcription errors are possible.

## 2024-08-07 NOTE — CONSULTS
.Interval History    Patient is doing  well eating lunch    ROS  Denies chest pain, shortness of breath,  Nausea, vomiting , diarrhea, fever or chills.     No change in Past Medical History        I&O 24HR    Intake/Output Summary (Last 24 hours) at 8/7/2024 0959  Last data filed at 8/7/2024 0935  Gross per 24 hour   Intake 2148.34 ml   Output --   Net 2148.34 ml       Vitals 24HR  Heart Rate:  [69-81]   Temp:  [36.1 °C (97 °F)-36.6 °C (97.9 °F)]   Resp:  [16-17]   BP: (117-142)/(73-85)   SpO2:  [96 %-98 %]   .  Vitals:    08/06/24 2100 08/07/24 0100 08/07/24 0500 08/07/24 0935   BP: 132/85 123/79 117/73 139/82   BP Location: Right arm Right arm Right arm Left arm   Patient Position: Lying Lying Lying Lying   Pulse: 74 76 78 77   Resp:  16 16 16   Temp: 36.6 °C (97.9 °F) 36.6 °C (97.9 °F) 36.5 °C (97.7 °F) 36.4 °C (97.6 °F)   TempSrc: Temporal Temporal Temporal Temporal   SpO2: 98% 97% 97% 96%   Weight:       Height:        .  Results from last 7 days   Lab Units 08/07/24 0512 08/06/24 0425 08/05/24 2206   SODIUM mmol/L 135* 135* 134*   POTASSIUM mmol/L 3.5 3.2* 3.6   CHLORIDE mmol/L 104 103 101   CO2 mmol/L 25 21 23   BUN mg/dL 26* 33* 34*   CREATININE mg/dL 0.92 1.74* 2.09*   EGFR mL/min/1.73m*2 86 40* 32*   GLUCOSE mg/dL 98 102* 87   CALCIUM mg/dL 8.6 9.0 9.7   PHOSPHORUS mg/dL 3.1  --   --     .  Results from last 7 days   Lab Units 08/06/24 0425 08/05/24 2206 08/01/24 2019   WBC AUTO x10*3/uL 11.5* 12.1* 10.7   HEMOGLOBIN g/dL 13.3 14.8 13.9   HEMATOCRIT % 39.1 44.9 41.5   PLATELETS AUTO x10*3/uL 329 906 344        Current Facility-Administered Medications:     amLODIPine (Norvasc) tablet 10 mg, 10 mg, oral, Daily, Leonid Strauss DO, 10 mg at 08/07/24 0910    bisacodyl (Dulcolax) EC tablet 10 mg, 10 mg, oral, Daily PRN, Leonid Strauss DO    bisacodyl (Dulcolax) suppository 10 mg, 10 mg, rectal, Daily PRN, Leonid Strauss DO    carvedilol (Coreg) tablet 12.5 mg, 12.5 mg, oral, BID, Leonid Strauss DO, 12.5  mg at 08/07/24 0910    enoxaparin (Lovenox) syringe 40 mg, 40 mg, subcutaneous, q12h PJ, Leonid Strauss DO, 40 mg at 08/07/24 0910    guaiFENesin (Mucinex) 12 hr tablet 600 mg, 600 mg, oral, q12h PRN, Leonid Strauss DO    [Held by provider] lisinopril 20 mg, hydroCHLOROthiazide 25 mg for Zestoretic/Prinizide, , oral, Daily, Leonid Strauss DO    melatonin tablet 3 mg, 3 mg, oral, Nightly PRN, Leonid Strauss DO    ondansetron (Zofran) tablet 4 mg, 4 mg, oral, q8h PRN **OR** ondansetron (Zofran) injection 4 mg, 4 mg, intravenous, q8h PRN, Leonid Strauss DO     Relevant Results       Assessment/Plan   Acute kidney injury/ ATN hypoperfusion in the setting of blood pressure fluctuations (N17,9)  Hypokalemia (E87.6)  Acute metabolic acidosis (E87,21)  Obesity BMI 49.71(E66.01)  Leukocytosis  Hypotension  Recommendation    Serum creatinine improving close to baseline 0.5-0.7  Blood pressure improved off antihypertensives   Avoid abrupt fluctuations in blood pressures  replace lytes as needed  Magnesium level ordered  Metabolic acidosis improved   Strict I and O documentation  Hold all nephrotoxic agents and avoid contrast  renal artery duplex.: results are pending   Aldosterone/ renin pending  Renal US pending   Principal Problem:    BETHANY (acute kidney injury) (CMS-[facility])  Active Problems:    Primary hypertension    Obesity due to excess calories    Hypotension    Leukocytosis    Class 3 severe obesity in adult (Multi)          .Thank you for the consult and the opportunity to participate inn the care of this patient. Please do not hesitate to contact us with any questions or concern  YODIT Jessica, CNP  [facility]  [phone]   YODIT Marques-CNP

## 2024-08-07 NOTE — DISCHARGE INSTRUCTIONS
Patient should seek medical attention immediately if condition should worsen, new symptoms develop , no further improvement or recurrence of presenting symptomatology, patient warned.    Avoid nephrotoxins      Blood pressure check twice daily at home, record results for primary care physician and nephrology.      Recommend follow-up with nephrology as scheduled.

## 2024-08-07 NOTE — NURSING NOTE
Discharge instructions reviewed with pt and family. All verbalize understanding. IV SL discontinued without difficulty.

## 2024-08-09 LAB
ATRIAL RATE: 83 BPM
P AXIS: 20 DEGREES
PR INTERVAL: 165 MS
Q ONSET: 249 MS
QRS COUNT: 14 BEATS
QRS DURATION: 108 MS
QT INTERVAL: 398 MS
QTC CALCULATION(BAZETT): 468 MS
QTC FREDERICIA: 443 MS
R AXIS: 30 DEGREES
T AXIS: 97 DEGREES
T OFFSET: 448 MS
VENTRICULAR RATE: 83 BPM

## 2024-08-11 LAB
ALDOST SERPL-MCNC: 7.7 NG/DL
ALDOST/RENIN PLAS-RTO: 1.4 RATIO
RENIN PLAS-CCNC: 5.7 NG/ML/HR

## 2024-08-21 LAB
ATRIAL RATE: 93 BPM
P AXIS: 60 DEGREES
PR INTERVAL: 168 MS
Q ONSET: 249 MS
QRS COUNT: 15 BEATS
QRS DURATION: 118 MS
QT INTERVAL: 369 MS
QTC CALCULATION(BAZETT): 457 MS
QTC FREDERICIA: 425 MS
R AXIS: 42 DEGREES
T AXIS: 22 DEGREES
T OFFSET: 434 MS
VENTRICULAR RATE: 92 BPM

## 2024-08-26 ENCOUNTER — APPOINTMENT (OUTPATIENT)
Dept: PRIMARY CARE | Facility: CLINIC | Age: 29
End: 2024-08-26
Payer: COMMERCIAL

## 2024-08-27 ENCOUNTER — APPOINTMENT (OUTPATIENT)
Dept: PRIMARY CARE | Facility: CLINIC | Age: 29
End: 2024-08-27
Payer: COMMERCIAL

## 2024-08-27 VITALS
WEIGHT: 293 LBS | HEIGHT: 66 IN | BODY MASS INDEX: 47.09 KG/M2 | DIASTOLIC BLOOD PRESSURE: 69 MMHG | SYSTOLIC BLOOD PRESSURE: 122 MMHG | HEART RATE: 68 BPM

## 2024-08-27 DIAGNOSIS — E66.01 MORBID OBESITY (MULTI): Primary | ICD-10-CM

## 2024-08-27 DIAGNOSIS — I10 PRIMARY HYPERTENSION: ICD-10-CM

## 2024-08-27 PROBLEM — I95.9 HYPOTENSION: Status: RESOLVED | Noted: 2024-08-06 | Resolved: 2024-08-27

## 2024-08-27 PROCEDURE — 90715 TDAP VACCINE 7 YRS/> IM: CPT | Performed by: FAMILY MEDICINE

## 2024-08-27 PROCEDURE — 90471 IMMUNIZATION ADMIN: CPT | Performed by: FAMILY MEDICINE

## 2024-08-27 PROCEDURE — 3074F SYST BP LT 130 MM HG: CPT | Performed by: FAMILY MEDICINE

## 2024-08-27 PROCEDURE — 99204 OFFICE O/P NEW MOD 45 MIN: CPT | Performed by: FAMILY MEDICINE

## 2024-08-27 PROCEDURE — 3078F DIAST BP <80 MM HG: CPT | Performed by: FAMILY MEDICINE

## 2024-08-27 PROCEDURE — 3008F BODY MASS INDEX DOCD: CPT | Performed by: FAMILY MEDICINE

## 2024-08-27 RX ORDER — LISINOPRIL 10 MG/1
1 TABLET ORAL DAILY
COMMUNITY
Start: 2024-08-20 | End: 2024-11-18

## 2024-08-27 NOTE — ASSESSMENT & PLAN NOTE
BP has been controlled. Continue BP pills.  DASH diet and regular exercise. Decrease calorie intake to lose wt.  Fu in 3 mos    Orders:    Referral to Primary Care - Family Practice

## 2024-08-27 NOTE — PROGRESS NOTES
Subjective   Patient ID: Bernadette Meeks is a 29 y.o. female who presents for est pcp    HPI   Patient has been compliant with taking all  current medications.  No CP, HA, dizziness, heart palpitation. No claudication or cold LE.  No LE edema. Pt would like to try med tx for Wt loss.   Review of Systems    Objective   LMP 08/01/2024     Physical Exam  NAD, well groomed, No sclera icterus.   lungs: CTA b/l, heart: RRR, No LE edema, normal pedal pulses, abd: soft, no tenderness, BS+,  Good balance. CNII-XII were grossly intact, good judgment and memory. No depressed mood.    Assessment/Plan   Assessment & Plan  Primary hypertension  BP has been controlled. Continue BP pills.  DASH diet and regular exercise. Decrease calorie intake to lose wt.  Fu in 3 mos    Orders:    Referral to Primary Care - Family Practice    Morbid obesity (Multi)  Recommend regular aerobic exercise with low fat and low cholesterol diet. Will monitor weight, blood glucose and cholesterol regularly. Pt declined bariatric or nutritionist evaluation. Medications such as shadi, phentermine, contrave, qsymia were reviewed with the pt. Pt is considering. Rtc for cpe

## 2024-08-27 NOTE — ASSESSMENT & PLAN NOTE
Recommend regular aerobic exercise with low fat and low cholesterol diet. Will monitor weight, blood glucose and cholesterol regularly. Pt declined bariatric or nutritionist evaluation. Medications such as shadi, phentermine, contrave, qsymia were reviewed with the pt. Pt is considering. Rtc for cpe

## 2024-09-04 ENCOUNTER — APPOINTMENT (OUTPATIENT)
Dept: PRIMARY CARE | Facility: CLINIC | Age: 29
End: 2024-09-04
Payer: COMMERCIAL

## 2024-09-04 DIAGNOSIS — Z00.00 ROUTINE MEDICAL EXAM: Primary | ICD-10-CM

## 2024-09-04 PROBLEM — E66.09 OBESITY DUE TO EXCESS CALORIES: Status: RESOLVED | Noted: 2024-08-01 | Resolved: 2024-09-04

## 2024-09-04 PROBLEM — N17.9 AKI (ACUTE KIDNEY INJURY) (CMS-HCC): Status: RESOLVED | Noted: 2024-08-06 | Resolved: 2024-09-04

## 2024-09-04 PROCEDURE — 1036F TOBACCO NON-USER: CPT | Performed by: FAMILY MEDICINE

## 2024-09-04 PROCEDURE — 99395 PREV VISIT EST AGE 18-39: CPT | Performed by: FAMILY MEDICINE

## 2024-09-04 PROCEDURE — 90471 IMMUNIZATION ADMIN: CPT | Performed by: FAMILY MEDICINE

## 2024-09-04 PROCEDURE — 90656 IIV3 VACC NO PRSV 0.5 ML IM: CPT | Performed by: FAMILY MEDICINE

## 2024-09-04 NOTE — PROGRESS NOTES
pt has regular dental visits. no vision problems. no hearing loss.   Lifestyle: healthy diet. + weight concerns. Pt exercises regularly. no tobacco or alcohol abuse.   Safety elements used: seat belt, safe driving habits and smoke detector.   No passive smoke exposure, chemical abuse, domestic violence, anxiety symptoms, depression symptoms.  Pt has safe sexual behavior, safe driving habits. No driving violations, history of DUI.  No tuberculosis exposure.   Reproductive health: the patient is premenopausal. she reports normal menses. she uses iud. she is sexually active.   Cervical cancer screening:. patient has no history of an abnormal pap smear.   Review of Systems  Constitutional: no chills, no fever and no night sweats.   Eyes: no blurred vision and no eyesight problems.   ENT: no hearing loss, no nasal congestion, no nasal discharge, no hoarseness and no sore throat.   Neck: no mass(es) and no swelling.   Cardiovascular: no chest pain, no intermittent leg claudication, no lower extremity edema, no palpitations and no syncope.   Respiratory: no cough,  no shortness of breath.   Gastrointestinal: no abdominal pain, no constipation, no blood in stools, no diarrhea, no melena, no nausea,  vomiting.   Genitourinary: no dysuria, no change in urinary frequency, no genital lesions, no hematuria, no urinary hesitancy, no incontinence, no nocturia, no local lump, no sexual dysfunction,  no feelings of urinary urgency.   Musculoskeletal: no arthralgias, no back pain, no localized joint pain and no myalgias.   Integumentary: no new skin lesions, no rashes and no skin wound.   Neurological: no difficulty walking, no headache, no limb weakness, no numbness and no tingling.   Psychiatric: no anxiety, no personality change, no depression, no homicidal thoughts, no sleep disturbances and no substance use disorders.   Endocrine: no changes in appetite, no deepening of the voice, no polyuria, no feelings of  weakness  Hematologic/Lymphatic: no tendency for easy bleeding, no tendency for easy bruising, no recurrent infections and no swollen glands.     Physical Exam  Constitutional: Alert and in no acute distress. Well developed, well nourished.   Head and Face: Head and face: Normal.    Eyes: Normal external exam. Pupils: normal size and EOMI. No sclera icterus  Ears, Nose, Mouth, and Throat: External inspection of ears and nose: Normal.  Hearing: Normal.  Nasal mucosa, septum, and turbinates: Normal.  Oropharynx: Normal.    Neck: No neck mass was observed. Supple. Thyroid not enlarged  Cardiovascular: Heart rate and rhythm were normal. Pedal pulses: Normal. No peripheral edema.   Pulmonary: No respiratory distress. Clear bilateral breath sounds.   Chest wall: Normal.    Abdomen: Soft nontender; no abdominal mass palpated. No organomegaly. Musculoskeletal: Gait and station: Normal. No joint swelling seen, normal movements of all extremities. Range of motion: Normal.  Muscle strength/tone: Normal.    Skin: Normal skin color and pigmentation, normal skin turgor, and no rash. Palpation of skin and subcutaneous tissue: Normal.    Neurologic: Cranial nerves 2-12 grossly intact.  Sensation: Normal. Coordination: Normal.    Psychiatric: Judgment and insight: Intact. Alert and oriented x 3. Recent and remote memory: Normal.  Mood and affect: Normal.   Lymphatic: No cervical lymphadenopathy.     unremarkable PE except morbid obesity. recommend nutritionist eval. Recommend DASH diet and regular exercise. check CBC, CMP, lipid, TSH.  Advise eye exam by an OD yearly and dental exam every 6 months. will monitor lipid and weight yearly. Recommend sunscreen application if exposed to the sun when UV index is above 2.   recommend Hep C, hepB, HIV test. recommend   hepB, flu, covid shot.   We will call pt regarding lab results. f/u as scheduled.   Recommend pap smear. Pt prefers to see her Gynecologist for evaluation.   Patient is not  sick today. Patient is not anxious about getting a shot today. Patient has never had Guillain Barré syndrome. Patient has never felt dizzy or faint before, during, or after a shot. Patient has never had a serious reaction to influenza vaccine in the past.  Patient has never had an allergy to an ingredient of influenza vaccine.  Flu shot was given via IM today.

## 2024-11-27 ENCOUNTER — APPOINTMENT (OUTPATIENT)
Dept: PRIMARY CARE | Facility: CLINIC | Age: 29
End: 2024-11-27
Payer: COMMERCIAL

## 2024-11-27 VITALS — SYSTOLIC BLOOD PRESSURE: 138 MMHG | DIASTOLIC BLOOD PRESSURE: 86 MMHG | HEART RATE: 76 BPM | RESPIRATION RATE: 14 BRPM

## 2024-11-27 DIAGNOSIS — I10 PRIMARY HYPERTENSION: Primary | ICD-10-CM

## 2024-11-27 DIAGNOSIS — J20.9 ACUTE BRONCHITIS, UNSPECIFIED ORGANISM: ICD-10-CM

## 2024-11-27 PROCEDURE — 3075F SYST BP GE 130 - 139MM HG: CPT | Performed by: FAMILY MEDICINE

## 2024-11-27 PROCEDURE — 99214 OFFICE O/P EST MOD 30 MIN: CPT | Performed by: FAMILY MEDICINE

## 2024-11-27 PROCEDURE — 3079F DIAST BP 80-89 MM HG: CPT | Performed by: FAMILY MEDICINE

## 2024-11-27 RX ORDER — LISINOPRIL 10 MG/1
1 TABLET ORAL
COMMUNITY
Start: 2024-11-17

## 2024-11-27 RX ORDER — ALBUTEROL SULFATE 90 UG/1
2 INHALANT RESPIRATORY (INHALATION) EVERY 4 HOURS PRN
Qty: 8 G | Refills: 1 | Status: SHIPPED | OUTPATIENT
Start: 2024-11-27 | End: 2025-11-27

## 2024-11-27 NOTE — PROGRESS NOTES
Subjective   Patient ID: Bernadette Meeks is a 29 y.o. female who presents for fu    HPI   Patient has been compliant with taking all  current medications. No CP, HA, dizziness, heart palpitation. No  LE edema. Patient started to have cough with clear phlegm 7 days ago. No sob, hemoptysis, neck stiffness or confusion. Pt has had normal appetite. Symptoms persisted today.      Review of Systems    Objective   /86   Pulse 76   Resp 14     Physical Exam  Mild distress, well groomed, eyes: PERRLA, No sclera icterus. No  nasal discharge, neck: supple, no cervical lymphadenopathy, lungs: mild wheezing b/l, no rales, heart: RRR or LE edema, abd: soft, no tenderness, BS+, Good balance.     Assessment/Plan   Assessment & Plan  Primary hypertension  BP has been controlled. Continue BP pills. keep a daily  bp log and bring in the log at the next office visit. Call office if BP is persistently over 130/80. DASH diet and regular exercise.   Acute bronchitis, unspecified organism  Likely viral etiology.  albuterol inhaler  as above. call office if symptoms do not improve in 3-4 days.    Orders:    albuterol (Ventolin HFA) 90 mcg/actuation inhaler; Inhale 2 puffs every 4 hours if needed for wheezing or shortness of breath.

## 2025-02-27 ENCOUNTER — APPOINTMENT (OUTPATIENT)
Dept: PRIMARY CARE | Facility: CLINIC | Age: 30
End: 2025-02-27
Payer: COMMERCIAL

## 2025-06-25 ENCOUNTER — APPOINTMENT (OUTPATIENT)
Dept: PRIMARY CARE | Facility: CLINIC | Age: 30
End: 2025-06-25
Payer: COMMERCIAL

## 2025-06-30 ENCOUNTER — TELEPHONE (OUTPATIENT)
Dept: PRIMARY CARE | Facility: CLINIC | Age: 30
End: 2025-06-30
Payer: COMMERCIAL

## 2025-06-30 DIAGNOSIS — I16.0 HYPERTENSIVE URGENCY: ICD-10-CM

## 2025-06-30 DIAGNOSIS — I10 PRIMARY HYPERTENSION: ICD-10-CM

## 2025-06-30 RX ORDER — AMLODIPINE BESYLATE 10 MG/1
10 TABLET ORAL DAILY
Qty: 30 TABLET | Refills: 11 | Status: SHIPPED | OUTPATIENT
Start: 2025-06-30 | End: 2026-06-30

## 2025-06-30 RX ORDER — CARVEDILOL 12.5 MG/1
12.5 TABLET ORAL 2 TIMES DAILY
Qty: 60 TABLET | Refills: 11 | Status: SHIPPED | OUTPATIENT
Start: 2025-06-30 | End: 2026-06-30

## 2025-06-30 NOTE — TELEPHONE ENCOUNTER
carvedilol (Coreg) 12.5 mg tablet//Take 1 tablet (12.5 mg) by mouth 2 times a day.    amLODIPine (Norvasc) 10 mg tablet//Take 1 tablet (10 mg) by mouth once daily.  [id number] - [place]

## 2025-07-17 ENCOUNTER — APPOINTMENT (OUTPATIENT)
Dept: PRIMARY CARE | Facility: CLINIC | Age: 30
End: 2025-07-17
Payer: COMMERCIAL

## 2025-09-07 ENCOUNTER — PHARMACY VISIT (OUTPATIENT)
Dept: PHARMACY | Facility: CLINIC | Age: 30
End: 2025-09-07
Payer: COMMERCIAL

## 2025-09-07 PROCEDURE — RXMED WILLOW AMBULATORY MEDICATION CHARGE

## 2025-09-19 ENCOUNTER — APPOINTMENT (OUTPATIENT)
Dept: PRIMARY CARE | Facility: CLINIC | Age: 30
End: 2025-09-19
Payer: COMMERCIAL